# Patient Record
Sex: FEMALE | Race: WHITE | Employment: UNEMPLOYED
[De-identification: names, ages, dates, MRNs, and addresses within clinical notes are randomized per-mention and may not be internally consistent; named-entity substitution may affect disease eponyms.]

---

## 2018-02-05 ENCOUNTER — HOSPITAL ENCOUNTER (OUTPATIENT)
Facility: MEDICAL CENTER | Age: 63
End: 2018-02-05
Payer: MEDICARE

## 2018-02-06 ENCOUNTER — TELEPHONE (OUTPATIENT)
Dept: ONCOLOGY | Age: 63
End: 2018-02-06

## 2018-02-06 ENCOUNTER — INITIAL CONSULT (OUTPATIENT)
Dept: ONCOLOGY | Age: 63
End: 2018-02-06
Payer: MEDICARE

## 2018-02-06 VITALS
RESPIRATION RATE: 20 BRPM | BODY MASS INDEX: 29.95 KG/M2 | TEMPERATURE: 98.2 F | WEIGHT: 169 LBS | SYSTOLIC BLOOD PRESSURE: 123 MMHG | DIASTOLIC BLOOD PRESSURE: 65 MMHG | HEIGHT: 63 IN | HEART RATE: 100 BPM

## 2018-02-06 DIAGNOSIS — E61.1 IRON DEFICIENCY: Primary | ICD-10-CM

## 2018-02-06 DIAGNOSIS — K90.49 MALABSORPTION DUE TO INTOLERANCE, NOT ELSEWHERE CLASSIFIED: ICD-10-CM

## 2018-02-06 PROCEDURE — G8427 DOCREV CUR MEDS BY ELIG CLIN: HCPCS | Performed by: INTERNAL MEDICINE

## 2018-02-06 PROCEDURE — 3017F COLORECTAL CA SCREEN DOC REV: CPT | Performed by: INTERNAL MEDICINE

## 2018-02-06 PROCEDURE — 99204 OFFICE O/P NEW MOD 45 MIN: CPT | Performed by: INTERNAL MEDICINE

## 2018-02-06 PROCEDURE — 99201 HC NEW PT, E/M LEVEL 1: CPT

## 2018-02-06 PROCEDURE — G8419 CALC BMI OUT NRM PARAM NOF/U: HCPCS | Performed by: INTERNAL MEDICINE

## 2018-02-06 PROCEDURE — G8484 FLU IMMUNIZE NO ADMIN: HCPCS | Performed by: INTERNAL MEDICINE

## 2018-02-06 PROCEDURE — 3014F SCREEN MAMMO DOC REV: CPT | Performed by: INTERNAL MEDICINE

## 2018-02-06 RX ORDER — SODIUM CHLORIDE 9 MG/ML
INJECTION, SOLUTION INTRAVENOUS ONCE
Status: CANCELLED | OUTPATIENT
Start: 2018-02-14 | End: 2018-02-06

## 2018-02-06 RX ORDER — SODIUM CHLORIDE 0.9 % (FLUSH) 0.9 %
5 SYRINGE (ML) INJECTION PRN
Status: CANCELLED | OUTPATIENT
Start: 2018-02-14

## 2018-02-06 RX ORDER — HEPARIN SODIUM (PORCINE) LOCK FLUSH IV SOLN 100 UNIT/ML 100 UNIT/ML
500 SOLUTION INTRAVENOUS PRN
Status: CANCELLED | OUTPATIENT
Start: 2018-02-14

## 2018-02-06 RX ORDER — METHYLPREDNISOLONE SODIUM SUCCINATE 125 MG/2ML
125 INJECTION, POWDER, LYOPHILIZED, FOR SOLUTION INTRAMUSCULAR; INTRAVENOUS ONCE
Status: CANCELLED | OUTPATIENT
Start: 2018-02-14 | End: 2018-02-06

## 2018-02-06 RX ORDER — SODIUM CHLORIDE 0.9 % (FLUSH) 0.9 %
10 SYRINGE (ML) INJECTION PRN
Status: CANCELLED | OUTPATIENT
Start: 2018-02-14

## 2018-02-06 RX ORDER — DIPHENHYDRAMINE HYDROCHLORIDE 50 MG/ML
25 INJECTION INTRAMUSCULAR; INTRAVENOUS ONCE
Status: CANCELLED | OUTPATIENT
Start: 2018-02-14 | End: 2018-02-06

## 2018-02-06 RX ORDER — DIPHENHYDRAMINE HYDROCHLORIDE 50 MG/ML
50 INJECTION INTRAMUSCULAR; INTRAVENOUS ONCE
Status: CANCELLED | OUTPATIENT
Start: 2018-02-14 | End: 2018-02-06

## 2018-02-06 RX ORDER — SODIUM CHLORIDE 9 MG/ML
INJECTION, SOLUTION INTRAVENOUS CONTINUOUS
Status: CANCELLED | OUTPATIENT
Start: 2018-02-14

## 2018-02-06 RX ORDER — 0.9 % SODIUM CHLORIDE 0.9 %
10 VIAL (ML) INJECTION ONCE
Status: CANCELLED | OUTPATIENT
Start: 2018-02-14 | End: 2018-02-06

## 2018-02-06 NOTE — LETTER
· Allergic/Immunologic: No nasal congestion or hives. No repeated infections. PHYSICAL EXAM:  The patient is not in acute distress. Vital signs: Blood pressure 123/65, pulse 100, temperature 98.2 °F (36.8 °C), temperature source Oral, resp. rate 20, height 5' 3\" (1.6 m), weight 169 lb (76.7 kg). HEENT:  Eyes are normal. Ears, nose and throat are normal.  Neck: Supple. No lymph node enlargement. No thyroid enlargement. Trachea is centrally located. Chest:  Clear to auscultation. No wheezes or crepitations. Heart: Regular sinus rhythm. Abdomen: Soft, nontender. No hepatosplenomegaly. No masses. Extremities:  With no edema. Lymph Nodes:  No cervical, axillary or inguinal lymph node enlargement. Neurologic:  Conscious and oriented. No focal neurological deficits. Psychosocial: No depression, anxiety or stress. Skin: No rashes, bruises or ecchymoses. Review of Diagnostic data:   No results for input(s): WBC, HGB, HCT, MCV, PLT in the last 720 hours. Chemistry        Component Value Date/Time     10/25/2016 1145    K 4.0 10/25/2016 1145     10/25/2016 1145    CO2 26 10/25/2016 1145    BUN 15 10/25/2016 1145    CREATININE 0.72 10/25/2016 1145        Component Value Date/Time    CALCIUM 10.0 10/25/2016 1145    ALKPHOS 104 09/13/2016 1515    AST 24 09/13/2016 1515    ALT 18 09/13/2016 1515    BILITOT 0.28 (L) 09/13/2016 1515        Lab Results   Component Value Date    FERRITIN 17 02/14/2013     Labs from outside facility showed evidence of severe microcytic anemia with low iron and low ferritin level. IMPRESSION:   Severe microcytic anemia  Severe iron deficiency  Intolerable side effects to oral iron  Gastroparesis  Constipation  Hemorrhoids  Anal fissure    PLAN: I reviewed the labs available to me and discussed with the patient. For more than 60 minutes of face to face discussion, I explained to the patient the nature of this hematologic problem.  I explained the

## 2018-02-06 NOTE — PROGRESS NOTES
_               Ms. Juan Perez is a very pleasant 57 y.o. female with history of multiple comorbidities as listed. She had history of GERD and history of gastroparesis. In addition she had chronic constipation and problems with hemorrhoids bleeding anal fissure. She had GI workup about 2 years ago and was negative. Patient continued to have anemia. She was started on oral iron but she could not tolerate it. She had gastric upset and she had constipation. Patient complains of increasing weakness and fatigue. She feels tired. He has shortness of breath on exertion. No palpitation. She has history of hemorrhoids with occasional bleeding as stated above but no other source of bleeding. No melena or hematochezia. No hematemesis. No vaginal bleeding. No weight loss or decreased appetite. No other complaints. PAST MEDICAL HISTORY: has a past medical history of Anxiety; Arthritis; Depression; Fibromyalgia; GERD (gastroesophageal reflux disease); Hyperlipidemia; and Seizures ([address] PAST SURGICAL HISTORY: has a past surgical history that includes Tubal ligation; lumbar fusion; Carpal tunnel release (Bilateral); Cholecystectomy; knee surgery (Right); and cervical fusion. CURRENT MEDICATIONS:  has a current medication list which includes the following prescription(s): pantoprazole, trulicity, humalog kwikpen, levothyroxine, promethazine, mupirocin, pitavastatin, benazepril, aspirin low dose, naproxen, tizanidine, hydrocodone-acetaminophen, metformin, levetiracetam, duloxetine, fentanyl, alprazolam, abilify, and amitiza. ALLERGIES:  is allergic to amantadines; codeine; darvocet a500 [propoxyphene n-acetaminophen]; depakote [valproic acid]; dye [iodides]; loratadine; lyrica [pregabalin]; nabumetone; pamelor [nortriptyline]; sanctura [trospium]; and wellbutrin [bupropion].     FAMILY HISTORY: Negative for any

## 2018-02-07 ENCOUNTER — HOSPITAL ENCOUNTER (OUTPATIENT)
Facility: MEDICAL CENTER | Age: 63
End: 2018-02-07

## 2018-02-14 ENCOUNTER — HOSPITAL ENCOUNTER (OUTPATIENT)
Dept: INFUSION THERAPY | Facility: MEDICAL CENTER | Age: 63
Discharge: HOME | End: 2018-02-14
Payer: MEDICARE

## 2018-02-14 VITALS
SYSTOLIC BLOOD PRESSURE: 140 MMHG | RESPIRATION RATE: 16 BRPM | TEMPERATURE: 99.2 F | HEART RATE: 98 BPM | DIASTOLIC BLOOD PRESSURE: 75 MMHG

## 2018-02-14 DIAGNOSIS — K90.49 MALABSORPTION DUE TO INTOLERANCE, NOT ELSEWHERE CLASSIFIED: ICD-10-CM

## 2018-02-14 DIAGNOSIS — E61.1 IRON DEFICIENCY: ICD-10-CM

## 2018-02-14 PROCEDURE — 96366 THER/PROPH/DIAG IV INF ADDON: CPT

## 2018-02-14 PROCEDURE — 2580000003 HC RX 258: Performed by: INTERNAL MEDICINE

## 2018-02-14 PROCEDURE — 96376 TX/PRO/DX INJ SAME DRUG ADON: CPT

## 2018-02-14 PROCEDURE — 6360000002 HC RX W HCPCS: Performed by: INTERNAL MEDICINE

## 2018-02-14 PROCEDURE — 96365 THER/PROPH/DIAG IV INF INIT: CPT

## 2018-02-14 RX ORDER — SODIUM CHLORIDE 9 MG/ML
INJECTION, SOLUTION INTRAVENOUS ONCE
Status: COMPLETED | OUTPATIENT
Start: 2018-02-14 | End: 2018-02-14

## 2018-02-14 RX ORDER — DIPHENHYDRAMINE HYDROCHLORIDE 50 MG/ML
25 INJECTION INTRAMUSCULAR; INTRAVENOUS ONCE
Status: CANCELLED | OUTPATIENT
Start: 2018-02-14 | End: 2018-02-14

## 2018-02-14 RX ORDER — METHYLPREDNISOLONE SODIUM SUCCINATE 125 MG/2ML
125 INJECTION, POWDER, LYOPHILIZED, FOR SOLUTION INTRAMUSCULAR; INTRAVENOUS ONCE
Status: CANCELLED | OUTPATIENT
Start: 2018-02-14 | End: 2018-02-14

## 2018-02-14 RX ORDER — POLYETHYLENE GLYCOL 3350 17 G/17G
17 POWDER, FOR SOLUTION ORAL PRN
COMMUNITY
End: 2020-06-23

## 2018-02-14 RX ORDER — EPINEPHRINE 1 MG/ML
0.3 INJECTION, SOLUTION, CONCENTRATE INTRAVENOUS PRN
Status: CANCELLED | OUTPATIENT
Start: 2018-02-14

## 2018-02-14 RX ORDER — SODIUM CHLORIDE 9 MG/ML
INJECTION, SOLUTION INTRAVENOUS ONCE
Status: CANCELLED | OUTPATIENT
Start: 2018-02-14 | End: 2018-02-14

## 2018-02-14 RX ORDER — SODIUM CHLORIDE 9 MG/ML
INJECTION, SOLUTION INTRAVENOUS CONTINUOUS
Status: CANCELLED | OUTPATIENT
Start: 2018-02-14

## 2018-02-14 RX ORDER — NICOTINE POLACRILEX 2 MG
1 GUM BUCCAL 2 TIMES DAILY
COMMUNITY

## 2018-02-14 RX ORDER — HEPARIN SODIUM (PORCINE) LOCK FLUSH IV SOLN 100 UNIT/ML 100 UNIT/ML
500 SOLUTION INTRAVENOUS PRN
Status: CANCELLED | OUTPATIENT
Start: 2018-02-14

## 2018-02-14 RX ORDER — DESONIDE 0.5 MG/G
CREAM TOPICAL 2 TIMES DAILY
COMMUNITY
End: 2020-06-23 | Stop reason: ALTCHOICE

## 2018-02-14 RX ORDER — SODIUM CHLORIDE 0.9 % (FLUSH) 0.9 %
5 SYRINGE (ML) INJECTION PRN
Status: CANCELLED | OUTPATIENT
Start: 2018-02-14

## 2018-02-14 RX ORDER — DIPHENHYDRAMINE HYDROCHLORIDE 50 MG/ML
25 INJECTION INTRAMUSCULAR; INTRAVENOUS ONCE
Status: COMPLETED | OUTPATIENT
Start: 2018-02-14 | End: 2018-02-14

## 2018-02-14 RX ORDER — BUSPIRONE HYDROCHLORIDE 15 MG/1
15 TABLET ORAL 3 TIMES DAILY
COMMUNITY

## 2018-02-14 RX ORDER — DIPHENHYDRAMINE HYDROCHLORIDE 50 MG/ML
50 INJECTION INTRAMUSCULAR; INTRAVENOUS ONCE
Status: CANCELLED | OUTPATIENT
Start: 2018-02-14 | End: 2018-02-14

## 2018-02-14 RX ORDER — SODIUM CHLORIDE 0.9 % (FLUSH) 0.9 %
10 SYRINGE (ML) INJECTION PRN
Status: CANCELLED | OUTPATIENT
Start: 2018-02-14

## 2018-02-14 RX ORDER — 0.9 % SODIUM CHLORIDE 0.9 %
10 VIAL (ML) INJECTION ONCE
Status: CANCELLED | OUTPATIENT
Start: 2018-02-14 | End: 2018-02-14

## 2018-02-14 RX ORDER — M-VIT,TX,IRON,MINS/CALC/FOLIC 27MG-0.4MG
1 TABLET ORAL DAILY
COMMUNITY

## 2018-02-14 RX ADMIN — IRON DEXTRAN 25 MG: 50 INJECTION INTRAMUSCULAR; INTRAVENOUS at 10:35

## 2018-02-14 RX ADMIN — IRON DEXTRAN 1500 MG: 50 INJECTION INTRAMUSCULAR; INTRAVENOUS at 11:50

## 2018-02-14 RX ADMIN — SODIUM CHLORIDE: 9 INJECTION, SOLUTION INTRAVENOUS at 10:23

## 2018-02-14 RX ADMIN — DIPHENHYDRAMINE HYDROCHLORIDE 25 MG: 50 INJECTION, SOLUTION INTRAMUSCULAR; INTRAVENOUS at 10:23

## 2018-02-14 NOTE — PROGRESS NOTES
Corrinne Galt arrives ambulatory alone  Order for iron dextran reviewed  Noted numerous allergies  Iv started with #22 cathlon to rt forearm per policy  See MAR for pre medication  Test dose of infed given over ten minutes and no reaction noted.   Waited one hour after the test dose and no reaction noted  Iron dextran given over four hours and no reaction noted  Iv line flushed and iv line discontinued with needle intact  Pressure dressing applied  Discharged per ambulatory

## 2018-05-01 ENCOUNTER — HOSPITAL ENCOUNTER (OUTPATIENT)
Facility: MEDICAL CENTER | Age: 63
Discharge: HOME | End: 2018-05-01
Payer: MEDICARE

## 2018-05-01 DIAGNOSIS — E61.1 IRON DEFICIENCY: ICD-10-CM

## 2018-05-01 LAB
ABSOLUTE EOS #: 0.1 K/UL (ref 0–0.4)
ABSOLUTE IMMATURE GRANULOCYTE: ABNORMAL K/UL (ref 0–0.3)
ABSOLUTE LYMPH #: 1.6 K/UL (ref 1–4.8)
ABSOLUTE MONO #: 0.6 K/UL (ref 0.2–0.8)
BASOPHILS # BLD: 1 % (ref 0–2)
BASOPHILS ABSOLUTE: 0 K/UL (ref 0–0.2)
DIFFERENTIAL TYPE: ABNORMAL
EOSINOPHILS RELATIVE PERCENT: 2 % (ref 1–4)
FERRITIN: 271 UG/L (ref 13–150)
HCT VFR BLD CALC: 43.2 % (ref 36–46)
HEMOGLOBIN: 14.6 G/DL (ref 12–16)
IMMATURE GRANULOCYTES: ABNORMAL %
IRON SATURATION: 38 % (ref 20–55)
IRON: 112 UG/DL (ref 37–145)
LYMPHOCYTES # BLD: 28 % (ref 24–44)
MCH RBC QN AUTO: 29.9 PG (ref 26–34)
MCHC RBC AUTO-ENTMCNC: 33.7 G/DL (ref 31–37)
MCV RBC AUTO: 88.6 FL (ref 80–100)
MONOCYTES # BLD: 10 % (ref 1–7)
NRBC AUTOMATED: ABNORMAL PER 100 WBC
PDW BLD-RTO: 16.9 % (ref 11.5–14.5)
PLATELET # BLD: 145 K/UL (ref 130–400)
PLATELET ESTIMATE: ABNORMAL
PMV BLD AUTO: 7.6 FL (ref 6–12)
RBC # BLD: 4.88 M/UL (ref 4–5.2)
RBC # BLD: ABNORMAL 10*6/UL
SEG NEUTROPHILS: 59 % (ref 36–66)
SEGMENTED NEUTROPHILS ABSOLUTE COUNT: 3.4 K/UL (ref 1.8–7.7)
TOTAL IRON BINDING CAPACITY: 296 UG/DL (ref 250–450)
UNSATURATED IRON BINDING CAPACITY: 184 UG/DL (ref 112–347)
WBC # BLD: 5.7 K/UL (ref 3.5–11)
WBC # BLD: ABNORMAL 10*3/UL

## 2018-05-01 PROCEDURE — 83540 ASSAY OF IRON: CPT

## 2018-05-01 PROCEDURE — 83550 IRON BINDING TEST: CPT

## 2018-05-01 PROCEDURE — 85025 COMPLETE CBC W/AUTO DIFF WBC: CPT

## 2018-05-01 PROCEDURE — 36415 COLL VENOUS BLD VENIPUNCTURE: CPT

## 2018-05-01 PROCEDURE — 82728 ASSAY OF FERRITIN: CPT

## 2018-05-07 ENCOUNTER — HOSPITAL ENCOUNTER (OUTPATIENT)
Facility: MEDICAL CENTER | Age: 63
End: 2018-05-07

## 2018-05-08 ENCOUNTER — OFFICE VISIT (OUTPATIENT)
Dept: ONCOLOGY | Age: 63
End: 2018-05-08
Payer: MEDICARE

## 2018-05-08 ENCOUNTER — TELEPHONE (OUTPATIENT)
Dept: ONCOLOGY | Age: 63
End: 2018-05-08

## 2018-05-08 VITALS
TEMPERATURE: 98.7 F | WEIGHT: 169 LBS | DIASTOLIC BLOOD PRESSURE: 59 MMHG | RESPIRATION RATE: 20 BRPM | HEART RATE: 106 BPM | SYSTOLIC BLOOD PRESSURE: 113 MMHG | BODY MASS INDEX: 29.94 KG/M2

## 2018-05-08 DIAGNOSIS — K90.49 MALABSORPTION DUE TO INTOLERANCE, NOT ELSEWHERE CLASSIFIED: ICD-10-CM

## 2018-05-08 DIAGNOSIS — E61.1 IRON DEFICIENCY: Primary | ICD-10-CM

## 2018-05-08 PROCEDURE — G8427 DOCREV CUR MEDS BY ELIG CLIN: HCPCS | Performed by: INTERNAL MEDICINE

## 2018-05-08 PROCEDURE — 99214 OFFICE O/P EST MOD 30 MIN: CPT | Performed by: INTERNAL MEDICINE

## 2018-05-08 PROCEDURE — 1090F PRES/ABSN URINE INCON ASSESS: CPT | Performed by: INTERNAL MEDICINE

## 2018-05-08 PROCEDURE — 99211 OFF/OP EST MAY X REQ PHY/QHP: CPT

## 2018-05-08 PROCEDURE — 1036F TOBACCO NON-USER: CPT | Performed by: INTERNAL MEDICINE

## 2018-05-08 PROCEDURE — G8419 CALC BMI OUT NRM PARAM NOF/U: HCPCS | Performed by: INTERNAL MEDICINE

## 2018-05-08 PROCEDURE — 1123F ACP DISCUSS/DSCN MKR DOCD: CPT | Performed by: INTERNAL MEDICINE

## 2018-05-08 PROCEDURE — 4040F PNEUMOC VAC/ADMIN/RCVD: CPT | Performed by: INTERNAL MEDICINE

## 2018-05-08 PROCEDURE — 3017F COLORECTAL CA SCREEN DOC REV: CPT | Performed by: INTERNAL MEDICINE

## 2018-05-08 PROCEDURE — G8400 PT W/DXA NO RESULTS DOC: HCPCS | Performed by: INTERNAL MEDICINE

## 2018-07-18 ENCOUNTER — HOSPITAL ENCOUNTER (OUTPATIENT)
Dept: MAMMOGRAPHY | Age: 63
Discharge: HOME | End: 2018-07-20
Payer: MEDICARE

## 2018-07-18 DIAGNOSIS — Z12.31 VISIT FOR SCREENING MAMMOGRAM: ICD-10-CM

## 2018-07-18 PROCEDURE — 77063 BREAST TOMOSYNTHESIS BI: CPT

## 2018-08-30 ENCOUNTER — HOSPITAL ENCOUNTER (OUTPATIENT)
Age: 63
Discharge: HOME | End: 2018-09-01
Payer: MEDICARE

## 2018-08-30 ENCOUNTER — HOSPITAL ENCOUNTER (OUTPATIENT)
Dept: GENERAL RADIOLOGY | Age: 63
Discharge: HOME | End: 2018-09-01
Payer: MEDICARE

## 2018-08-30 DIAGNOSIS — F41.9 ANXIETY HYPERVENTILATION: ICD-10-CM

## 2018-08-30 DIAGNOSIS — F45.8 ANXIETY HYPERVENTILATION: ICD-10-CM

## 2018-08-30 PROCEDURE — 71046 X-RAY EXAM CHEST 2 VIEWS: CPT

## 2018-10-15 ENCOUNTER — TELEPHONE (OUTPATIENT)
Dept: ONCOLOGY | Age: 63
End: 2018-10-15

## 2018-10-24 ENCOUNTER — HOSPITAL ENCOUNTER (OUTPATIENT)
Facility: MEDICAL CENTER | Age: 63
End: 2018-10-24
Payer: COMMERCIAL

## 2018-10-26 ENCOUNTER — TELEPHONE (OUTPATIENT)
Dept: ONCOLOGY | Age: 63
End: 2018-10-26

## 2018-10-26 ENCOUNTER — HOSPITAL ENCOUNTER (OUTPATIENT)
Facility: MEDICAL CENTER | Age: 63
Discharge: HOME | End: 2018-10-26
Payer: COMMERCIAL

## 2018-10-26 DIAGNOSIS — E61.1 IRON DEFICIENCY: ICD-10-CM

## 2018-10-26 LAB
ABSOLUTE EOS #: 0.2 K/UL (ref 0–0.4)
ABSOLUTE IMMATURE GRANULOCYTE: ABNORMAL K/UL (ref 0–0.3)
ABSOLUTE LYMPH #: 2.1 K/UL (ref 1–4.8)
ABSOLUTE MONO #: 0.5 K/UL (ref 0.2–0.8)
BASOPHILS # BLD: 1 % (ref 0–2)
BASOPHILS ABSOLUTE: 0 K/UL (ref 0–0.2)
DIFFERENTIAL TYPE: ABNORMAL
EOSINOPHILS RELATIVE PERCENT: 2 % (ref 1–4)
FERRITIN: 257 UG/L (ref 13–150)
HCT VFR BLD CALC: 39.7 % (ref 36–46)
HEMOGLOBIN: 13.6 G/DL (ref 12–16)
IMMATURE GRANULOCYTES: ABNORMAL %
IRON SATURATION: 25 % (ref 20–55)
IRON: 67 UG/DL (ref 37–145)
LYMPHOCYTES # BLD: 32 % (ref 24–44)
MCH RBC QN AUTO: 31.3 PG (ref 26–34)
MCHC RBC AUTO-ENTMCNC: 34.3 G/DL (ref 31–37)
MCV RBC AUTO: 91.2 FL (ref 80–100)
MONOCYTES # BLD: 8 % (ref 1–7)
NRBC AUTOMATED: ABNORMAL PER 100 WBC
PDW BLD-RTO: 13.1 % (ref 11.5–14.5)
PLATELET # BLD: 187 K/UL (ref 130–400)
PLATELET ESTIMATE: ABNORMAL
PMV BLD AUTO: 7.7 FL (ref 6–12)
RBC # BLD: 4.36 M/UL (ref 4–5.2)
RBC # BLD: ABNORMAL 10*6/UL
SEG NEUTROPHILS: 57 % (ref 36–66)
SEGMENTED NEUTROPHILS ABSOLUTE COUNT: 3.8 K/UL (ref 1.8–7.7)
TOTAL IRON BINDING CAPACITY: 272 UG/DL (ref 250–450)
UNSATURATED IRON BINDING CAPACITY: 205 UG/DL (ref 112–347)
WBC # BLD: 6.7 K/UL (ref 3.5–11)
WBC # BLD: ABNORMAL 10*3/UL

## 2018-10-26 PROCEDURE — 36415 COLL VENOUS BLD VENIPUNCTURE: CPT

## 2018-10-26 PROCEDURE — 83550 IRON BINDING TEST: CPT

## 2018-10-26 PROCEDURE — 83540 ASSAY OF IRON: CPT

## 2018-10-26 PROCEDURE — 85025 COMPLETE CBC W/AUTO DIFF WBC: CPT

## 2018-10-26 PROCEDURE — 82728 ASSAY OF FERRITIN: CPT

## 2018-10-26 NOTE — TELEPHONE ENCOUNTER
Working on generic schedule and no lab values found from today. Placed call and spoke with Darren Quintero confirmed she intends to come in for lab later this afternoon. Miriam Kimball has a new [employer] and reports she plans to go to registration first to update account.   Confirmed standing orders are in system CDP/ Ferritin/ Fe.

## 2018-11-01 ENCOUNTER — TELEPHONE (OUTPATIENT)
Dept: ONCOLOGY | Age: 63
End: 2018-11-01

## 2018-11-01 ENCOUNTER — OFFICE VISIT (OUTPATIENT)
Dept: ONCOLOGY | Age: 63
End: 2018-11-01
Payer: COMMERCIAL

## 2018-11-01 VITALS
HEART RATE: 93 BPM | WEIGHT: 166.3 LBS | RESPIRATION RATE: 18 BRPM | TEMPERATURE: 98.3 F | BODY MASS INDEX: 29.46 KG/M2 | DIASTOLIC BLOOD PRESSURE: 61 MMHG | SYSTOLIC BLOOD PRESSURE: 111 MMHG

## 2018-11-01 DIAGNOSIS — E61.1 IRON DEFICIENCY: Primary | ICD-10-CM

## 2018-11-01 DIAGNOSIS — K90.49 MALABSORPTION DUE TO INTOLERANCE, NOT ELSEWHERE CLASSIFIED: ICD-10-CM

## 2018-11-01 DIAGNOSIS — G62.9 NEUROPATHY: ICD-10-CM

## 2018-11-01 PROCEDURE — 99214 OFFICE O/P EST MOD 30 MIN: CPT | Performed by: INTERNAL MEDICINE

## 2018-11-01 PROCEDURE — 1036F TOBACCO NON-USER: CPT | Performed by: INTERNAL MEDICINE

## 2018-11-01 PROCEDURE — 3017F COLORECTAL CA SCREEN DOC REV: CPT | Performed by: INTERNAL MEDICINE

## 2018-11-01 PROCEDURE — G8484 FLU IMMUNIZE NO ADMIN: HCPCS | Performed by: INTERNAL MEDICINE

## 2018-11-01 PROCEDURE — 1123F ACP DISCUSS/DSCN MKR DOCD: CPT | Performed by: INTERNAL MEDICINE

## 2018-11-01 PROCEDURE — 1101F PT FALLS ASSESS-DOCD LE1/YR: CPT | Performed by: INTERNAL MEDICINE

## 2018-11-01 PROCEDURE — 1090F PRES/ABSN URINE INCON ASSESS: CPT | Performed by: INTERNAL MEDICINE

## 2018-11-01 PROCEDURE — 99211 OFF/OP EST MAY X REQ PHY/QHP: CPT

## 2018-11-01 PROCEDURE — 4040F PNEUMOC VAC/ADMIN/RCVD: CPT | Performed by: INTERNAL MEDICINE

## 2018-11-01 PROCEDURE — G8400 PT W/DXA NO RESULTS DOC: HCPCS | Performed by: INTERNAL MEDICINE

## 2018-11-01 PROCEDURE — G8419 CALC BMI OUT NRM PARAM NOF/U: HCPCS | Performed by: INTERNAL MEDICINE

## 2018-11-01 PROCEDURE — G8427 DOCREV CUR MEDS BY ELIG CLIN: HCPCS | Performed by: INTERNAL MEDICINE

## 2018-11-01 RX ORDER — CEFUROXIME AXETIL 250 MG/1
250 TABLET ORAL 2 TIMES DAILY
COMMUNITY
Start: 2018-10-31 | End: 2019-05-23 | Stop reason: ALTCHOICE

## 2018-11-01 RX ORDER — ACETAMINOPHEN 325 MG/1
650 TABLET ORAL EVERY 6 HOURS PRN
COMMUNITY

## 2018-11-01 NOTE — PROGRESS NOTES
complaints. · Dermatologic: No skin rashes or pruritus. No skin lesions or discolorations. · Psychiatric: No depression, anxiety, or stress or signs of schizophrenia. No change in mood or affect. · Hematologic: No history of bleeding tendency. No bruises or ecchymosis. No history of clotting problems. · Infectious disease: No fever, chills or frequent infections. · Endocrine: No problems with opacity. No polydipsia or polyuria. No temperature intolerance. · Neurologic: No headaches or dizziness. No weakness or numbness of the extremities. No changes in balance, coordination,  memory, mentation, behavior. · Allergic/Immunologic: No nasal congestion or hives. No repeated infections. PHYSICAL EXAM:  The patient is not in acute distress. Vital signs: Blood pressure 111/61, pulse 93, temperature 98.3 °F (36.8 °C), temperature source Oral, resp. rate 18, weight 166 lb 4.8 oz (75.4 kg). HEENT:  Eyes are normal. Ears, nose and throat are normal.  Neck: Supple. No lymph node enlargement. No thyroid enlargement. Trachea is centrally located. Chest:  Clear to auscultation. No wheezes or crepitations. Heart: Regular sinus rhythm. Abdomen: Soft, nontender. No hepatosplenomegaly. No masses. Extremities:  With no edema. Lymph Nodes:  No cervical, axillary or inguinal lymph node enlargement. Neurologic:  Conscious and oriented. No focal neurological deficits. Psychosocial: No depression, anxiety or stress. Skin: No rashes, bruises or ecchymoses.       Review of Diagnostic data:   Recent Labs      10/26/18   1517   WBC  6.7   HGB  13.6   HCT  39.7   MCV  91.2   PLT  187       Chemistry        Component Value Date/Time     10/25/2016 1145    K 4.0 10/25/2016 1145     10/25/2016 1145    CO2 26 10/25/2016 1145    BUN 15 10/25/2016 1145    CREATININE 0.72 10/25/2016 1145        Component Value Date/Time    CALCIUM 10.0 10/25/2016 1145    ALKPHOS 104 09/13/2016 1515    AST 24 09/13/2016 1515

## 2019-01-17 ENCOUNTER — TELEPHONE (OUTPATIENT)
Dept: ONCOLOGY | Age: 64
End: 2019-01-17

## 2019-01-18 ENCOUNTER — TELEPHONE (OUTPATIENT)
Dept: ONCOLOGY | Age: 64
End: 2019-01-18

## 2019-01-22 ENCOUNTER — HOSPITAL ENCOUNTER (OUTPATIENT)
Facility: MEDICAL CENTER | Age: 64
Discharge: HOME | End: 2019-01-22
Payer: COMMERCIAL

## 2019-01-22 DIAGNOSIS — E61.1 IRON DEFICIENCY: ICD-10-CM

## 2019-01-22 LAB
ABSOLUTE EOS #: 0.1 K/UL (ref 0–0.4)
ABSOLUTE IMMATURE GRANULOCYTE: ABNORMAL K/UL (ref 0–0.3)
ABSOLUTE LYMPH #: 1.7 K/UL (ref 1–4.8)
ABSOLUTE MONO #: 0.6 K/UL (ref 0.2–0.8)
BASOPHILS # BLD: 0 % (ref 0–2)
BASOPHILS ABSOLUTE: 0 K/UL (ref 0–0.2)
DIFFERENTIAL TYPE: ABNORMAL
EOSINOPHILS RELATIVE PERCENT: 2 % (ref 1–4)
HCT VFR BLD CALC: 45 % (ref 36–46)
HEMOGLOBIN: 15.1 G/DL (ref 12–16)
IMMATURE GRANULOCYTES: ABNORMAL %
LYMPHOCYTES # BLD: 31 % (ref 24–44)
MCH RBC QN AUTO: 30.4 PG (ref 26–34)
MCHC RBC AUTO-ENTMCNC: 33.5 G/DL (ref 31–37)
MCV RBC AUTO: 90.9 FL (ref 80–100)
MONOCYTES # BLD: 11 % (ref 1–7)
NRBC AUTOMATED: ABNORMAL PER 100 WBC
PDW BLD-RTO: 13.3 % (ref 11.5–14.5)
PLATELET # BLD: 154 K/UL (ref 130–400)
PLATELET ESTIMATE: ABNORMAL
PMV BLD AUTO: 7.9 FL (ref 6–12)
RBC # BLD: 4.95 M/UL (ref 4–5.2)
RBC # BLD: ABNORMAL 10*6/UL
SEG NEUTROPHILS: 56 % (ref 36–66)
SEGMENTED NEUTROPHILS ABSOLUTE COUNT: 3.1 K/UL (ref 1.8–7.7)
WBC # BLD: 5.5 K/UL (ref 3.5–11)
WBC # BLD: ABNORMAL 10*3/UL

## 2019-01-22 PROCEDURE — 36415 COLL VENOUS BLD VENIPUNCTURE: CPT

## 2019-01-22 PROCEDURE — 85025 COMPLETE CBC W/AUTO DIFF WBC: CPT

## 2019-04-25 DIAGNOSIS — E61.1 IRON DEFICIENCY: Primary | ICD-10-CM

## 2019-04-30 ENCOUNTER — HOSPITAL ENCOUNTER (OUTPATIENT)
Facility: MEDICAL CENTER | Age: 64
Discharge: HOME | End: 2019-04-30
Payer: COMMERCIAL

## 2019-04-30 DIAGNOSIS — E61.1 IRON DEFICIENCY: ICD-10-CM

## 2019-04-30 LAB
ABSOLUTE EOS #: 0.06 K/UL (ref 0–0.44)
ABSOLUTE IMMATURE GRANULOCYTE: 0.01 K/UL (ref 0–0.3)
ABSOLUTE LYMPH #: 1.31 K/UL (ref 1.1–3.7)
ABSOLUTE MONO #: 0.55 K/UL (ref 0.1–1.2)
BASOPHILS # BLD: 1 % (ref 0–2)
BASOPHILS ABSOLUTE: 0.05 K/UL (ref 0–0.2)
DIFFERENTIAL TYPE: ABNORMAL
EOSINOPHILS RELATIVE PERCENT: 1 % (ref 1–4)
FERRITIN: 81 UG/L (ref 13–150)
HCT VFR BLD CALC: 43.4 % (ref 36.3–47.1)
HEMOGLOBIN: 14 G/DL (ref 11.9–15.1)
IMMATURE GRANULOCYTES: 0 %
IRON SATURATION: 20 % (ref 20–55)
IRON: 60 UG/DL (ref 37–145)
LYMPHOCYTES # BLD: 22 % (ref 24–43)
MCH RBC QN AUTO: 30.8 PG (ref 25.2–33.5)
MCHC RBC AUTO-ENTMCNC: 32.3 G/DL (ref 28.4–34.8)
MCV RBC AUTO: 95.6 FL (ref 82.6–102.9)
MONOCYTES # BLD: 9 % (ref 3–12)
NRBC AUTOMATED: 0 PER 100 WBC
PDW BLD-RTO: 12.7 % (ref 11.8–14.4)
PLATELET # BLD: 184 K/UL (ref 138–453)
PLATELET ESTIMATE: ABNORMAL
PMV BLD AUTO: 9.8 FL (ref 8.1–13.5)
RBC # BLD: 4.54 M/UL (ref 3.95–5.11)
RBC # BLD: ABNORMAL 10*6/UL
SEG NEUTROPHILS: 67 % (ref 36–65)
SEGMENTED NEUTROPHILS ABSOLUTE COUNT: 4.06 K/UL (ref 1.5–8.1)
TOTAL IRON BINDING CAPACITY: 307 UG/DL (ref 250–450)
UNSATURATED IRON BINDING CAPACITY: 247 UG/DL (ref 112–347)
WBC # BLD: 6 K/UL (ref 3.5–11.3)
WBC # BLD: ABNORMAL 10*3/UL

## 2019-04-30 PROCEDURE — 82728 ASSAY OF FERRITIN: CPT

## 2019-04-30 PROCEDURE — 83550 IRON BINDING TEST: CPT

## 2019-04-30 PROCEDURE — 85025 COMPLETE CBC W/AUTO DIFF WBC: CPT

## 2019-04-30 PROCEDURE — 36415 COLL VENOUS BLD VENIPUNCTURE: CPT

## 2019-04-30 PROCEDURE — 83540 ASSAY OF IRON: CPT

## 2019-05-02 ENCOUNTER — HOSPITAL ENCOUNTER (OUTPATIENT)
Facility: MEDICAL CENTER | Age: 64
End: 2019-05-02
Payer: COMMERCIAL

## 2019-05-23 ENCOUNTER — TELEPHONE (OUTPATIENT)
Dept: ONCOLOGY | Age: 64
End: 2019-05-23

## 2019-05-23 ENCOUNTER — OFFICE VISIT (OUTPATIENT)
Dept: ONCOLOGY | Age: 64
End: 2019-05-23
Payer: COMMERCIAL

## 2019-05-23 VITALS
DIASTOLIC BLOOD PRESSURE: 61 MMHG | TEMPERATURE: 99 F | WEIGHT: 157.3 LBS | BODY MASS INDEX: 27.86 KG/M2 | HEART RATE: 102 BPM | RESPIRATION RATE: 18 BRPM | SYSTOLIC BLOOD PRESSURE: 134 MMHG

## 2019-05-23 DIAGNOSIS — K90.49 MALABSORPTION DUE TO INTOLERANCE, NOT ELSEWHERE CLASSIFIED: Primary | ICD-10-CM

## 2019-05-23 DIAGNOSIS — E61.1 IRON DEFICIENCY: ICD-10-CM

## 2019-05-23 DIAGNOSIS — G62.9 NEUROPATHY: ICD-10-CM

## 2019-05-23 PROCEDURE — 3017F COLORECTAL CA SCREEN DOC REV: CPT | Performed by: INTERNAL MEDICINE

## 2019-05-23 PROCEDURE — 1036F TOBACCO NON-USER: CPT | Performed by: INTERNAL MEDICINE

## 2019-05-23 PROCEDURE — 99214 OFFICE O/P EST MOD 30 MIN: CPT | Performed by: INTERNAL MEDICINE

## 2019-05-23 PROCEDURE — G8419 CALC BMI OUT NRM PARAM NOF/U: HCPCS | Performed by: INTERNAL MEDICINE

## 2019-05-23 PROCEDURE — 1090F PRES/ABSN URINE INCON ASSESS: CPT | Performed by: INTERNAL MEDICINE

## 2019-05-23 PROCEDURE — 1123F ACP DISCUSS/DSCN MKR DOCD: CPT | Performed by: INTERNAL MEDICINE

## 2019-05-23 PROCEDURE — 4040F PNEUMOC VAC/ADMIN/RCVD: CPT | Performed by: INTERNAL MEDICINE

## 2019-05-23 PROCEDURE — G8400 PT W/DXA NO RESULTS DOC: HCPCS | Performed by: INTERNAL MEDICINE

## 2019-05-23 PROCEDURE — 99211 OFF/OP EST MAY X REQ PHY/QHP: CPT | Performed by: INTERNAL MEDICINE

## 2019-05-23 PROCEDURE — G8427 DOCREV CUR MEDS BY ELIG CLIN: HCPCS | Performed by: INTERNAL MEDICINE

## 2019-05-26 NOTE — PROGRESS NOTES
_           Chief Complaint   Patient presents with    Follow-up     neck surgery     DIAGNOSIS:       Severe microcytic anemia  Severe iron deficiency  Intolerable side effects to oral iron  Gastroparesis  Constipation  Hemorrhoids  Anal fissure      CURRENT THERAPY:         IV iron infusion February 2018    BRIEF CASE HISTORY:      Ms. Isadora Castillo is a very pleasant 75 y.o. female with history of multiple comorbidities as listed. She had history of GERD and history of gastroparesis. In addition she had chronic constipation and problems with hemorrhoids bleeding anal fissure. She had GI workup about 2 years ago and was negative. Patient continued to have anemia. She was started on oral iron but she could not tolerate it. She had gastric upset and she had constipation. Patient complains of increasing weakness and fatigue. She feels tired. He has shortness of breath on exertion. No palpitation. She has history of hemorrhoids with occasional bleeding as stated above but no other source of bleeding. No melena or hematochezia. No hematemesis. No vaginal bleeding. No weight loss or decreased appetite. No other complaints. INTERIM HISTORY:   The patient seen for follow-up iron deficiency anemia. She had IV iron infusion in February 2018. She did very well. Hemoglobin is normal.  She had more strength and energy. She continued to feel tired occasionally. No dizziness. No chest pain or palpitation. No active bleeding. PAST MEDICAL HISTORY: has a past medical history of Anxiety, Arthritis, Depression, Fibromyalgia, GERD (gastroesophageal reflux disease), Hyperlipidemia, and Seizures ([address] PAST SURGICAL HISTORY: has a past surgical history that includes Tubal ligation; lumbar fusion; Carpal tunnel release (Bilateral); Cholecystectomy; knee surgery (Right); and cervical fusion.      CURRENT MEDICATIONS: has a current medication list which includes the following prescription(s): magnesium gluconate, acetaminophen, emollient, therapeutic multivitamin-minerals, cranberry-vitamin c-vitamin e, desonide, polyethylene glycol, insulin aspart, biotin, vortioxetine, buspirone, milk thistle, pantoprazole, trulicity, promethazine, mupirocin, pitavastatin, benazepril, aspirin low dose, tizanidine, metformin, levetiracetam, and duloxetine. ALLERGIES:  is allergic to latex; amantadines; codeine; darvocet a500 [propoxyphene n-acetaminophen]; depakote [valproic acid]; dye [iodides]; loratadine; lyrica [pregabalin]; nabumetone; pamelor [nortriptyline]; sanctura [trospium]; and wellbutrin [bupropion]. FAMILY HISTORY: Negative for any hematological or oncological conditions. SOCIAL HISTORY:  reports that she has never smoked. She has never used smokeless tobacco. She reports that she does not drink alcohol or use drugs. REVIEW OF SYSTEMS:     · General: Positive for weakness and fatigue. No unanticipated weight loss or decreased appetite. No fever or chills. · Eyes: No blurred vision, eye pain or double vision. · Ears: No hearing problems or drainage. No tinnitus. · Throat: No sore throat, problems with swallowing or dysphagia. · Respiratory: No cough, sputum or hemoptysis. Positive for exertional shortness of breath. No pleuritic chest pain. · Cardiovascular: No chest pain, orthopnea or PND. No lower extremity edema. No palpitation. · Gastrointestinal: History of gastroparesis. No problems with swallowing. No abdominal pain or bloating. No nausea or vomiting. Positive for constipation. No GI bleeding. · Genitourinary: No dysuria, hematuria, frequency or urgency. · Musculoskeletal: No muscle aches or pains. No limitation of movement. No back pain. No gait disturbance, No joint complaints. · Dermatologic: No skin rashes or pruritus. No skin lesions or discolorations.    · Psychiatric: No depression, anxiety, or stress or signs of schizophrenia. No change in mood or affect. · Hematologic: No history of bleeding tendency. No bruises or ecchymosis. No history of clotting problems. · Infectious disease: No fever, chills or frequent infections. · Endocrine: No problems with opacity. No polydipsia or polyuria. No temperature intolerance. · Neurologic: No headaches or dizziness. No weakness or numbness of the extremities. No changes in balance, coordination,  memory, mentation, behavior. · Allergic/Immunologic: No nasal congestion or hives. No repeated infections. PHYSICAL EXAM:  The patient is not in acute distress. Vital signs: Blood pressure 134/61, pulse 102, temperature 99 °F (37.2 °C), temperature source Oral, resp. rate 18, weight 157 lb 4.8 oz (71.4 kg). HEENT:  Eyes are normal. Ears, nose and throat are normal.  Neck: Supple. No lymph node enlargement. No thyroid enlargement. Trachea is centrally located. Chest:  Clear to auscultation. No wheezes or crepitations. Heart: Regular sinus rhythm. Abdomen: Soft, nontender. No hepatosplenomegaly. No masses. Extremities:  With no edema. Lymph Nodes:  No cervical, axillary or inguinal lymph node enlargement. Neurologic:  Conscious and oriented. No focal neurological deficits. Psychosocial: No depression, anxiety or stress. Skin: No rashes, bruises or ecchymoses.       Review of Diagnostic data:   Recent Labs     04/30/19  1450   WBC 6.0   HGB 14.0   HCT 43.4   MCV 95.6          Chemistry        Component Value Date/Time     10/25/2016 1145    K 4.0 10/25/2016 1145     10/25/2016 1145    CO2 26 10/25/2016 1145    BUN 15 10/25/2016 1145    CREATININE 0.72 10/25/2016 1145        Component Value Date/Time    CALCIUM 10.0 10/25/2016 1145    ALKPHOS 104 09/13/2016 1515    AST 24 09/13/2016 1515    ALT 18 09/13/2016 1515    BILITOT 0.28 (L) 09/13/2016 1515        Lab Results   Component Value Date    IRON 60 04/30/2019    TIBC 307 04/30/2019    FERRITIN 81 04/30/2019     Labs from outside facility showed evidence of severe microcytic anemia with low iron and low ferritin level. IMPRESSION:   Severe microcytic anemia  Severe iron deficiency  Intolerable side effects to oral iron  Gastroparesis  Constipation  Hemorrhoids  Anal fissure    PLAN: I reviewed the labs as above and discussed with the patient. I explained to the patient the nature of this hematologic problem. I explained the significance of these abnormalities in layman language. Reviewing lab tests or systemic outside facility showed evidence of chronic microcytic anemia. She was treated for iron deficiency anemia in the past with variable results. She could not tolerate the oral iron. She was given IV iron infusion. Repeated labs showed excellent response to treatment. Hemoglobin is now normal.  Iron studies are normal.   Patient continues to feel slightly tired. I think this is related to different etiology. At this point recommendation is for monitoring. We will repeat the labs with iron studies in 12 months. Patient's questions were answered to the best of her satisfaction and she verbalized full understanding and agreement.

## 2020-06-16 ENCOUNTER — HOSPITAL ENCOUNTER (OUTPATIENT)
Facility: MEDICAL CENTER | Age: 65
Discharge: HOME | End: 2020-06-16
Payer: COMMERCIAL

## 2020-06-16 DIAGNOSIS — E61.1 IRON DEFICIENCY: ICD-10-CM

## 2020-06-16 LAB
ABSOLUTE EOS #: 0.07 K/UL (ref 0–0.44)
ABSOLUTE IMMATURE GRANULOCYTE: 0.02 K/UL (ref 0–0.3)
ABSOLUTE LYMPH #: 1.13 K/UL (ref 1.1–3.7)
ABSOLUTE MONO #: 0.3 K/UL (ref 0.1–1.2)
BASOPHILS # BLD: 1 % (ref 0–2)
BASOPHILS ABSOLUTE: 0.03 K/UL (ref 0–0.2)
DIFFERENTIAL TYPE: ABNORMAL
EOSINOPHILS RELATIVE PERCENT: 2 % (ref 1–4)
FERRITIN: 28 UG/L (ref 13–150)
HCT VFR BLD CALC: 42.2 % (ref 36.3–47.1)
HEMOGLOBIN: 13.5 G/DL (ref 11.9–15.1)
IMMATURE GRANULOCYTES: 1 %
IRON SATURATION: 23 % (ref 20–55)
IRON: 69 UG/DL (ref 37–145)
LYMPHOCYTES # BLD: 29 % (ref 24–43)
MCH RBC QN AUTO: 30.9 PG (ref 25.2–33.5)
MCHC RBC AUTO-ENTMCNC: 32 G/DL (ref 28.4–34.8)
MCV RBC AUTO: 96.6 FL (ref 82.6–102.9)
MONOCYTES # BLD: 8 % (ref 3–12)
NRBC AUTOMATED: 0 PER 100 WBC
PDW BLD-RTO: 12.5 % (ref 11.8–14.4)
PLATELET # BLD: 140 K/UL (ref 138–453)
PLATELET ESTIMATE: ABNORMAL
PMV BLD AUTO: 10.2 FL (ref 8.1–13.5)
RBC # BLD: 4.37 M/UL (ref 3.95–5.11)
RBC # BLD: ABNORMAL 10*6/UL
SEG NEUTROPHILS: 59 % (ref 36–65)
SEGMENTED NEUTROPHILS ABSOLUTE COUNT: 2.37 K/UL (ref 1.5–8.1)
TOTAL IRON BINDING CAPACITY: 298 UG/DL (ref 250–450)
UNSATURATED IRON BINDING CAPACITY: 229 UG/DL (ref 112–347)
WBC # BLD: 3.9 K/UL (ref 3.5–11.3)
WBC # BLD: ABNORMAL 10*3/UL

## 2020-06-16 PROCEDURE — 83540 ASSAY OF IRON: CPT

## 2020-06-16 PROCEDURE — 83550 IRON BINDING TEST: CPT

## 2020-06-16 PROCEDURE — 85025 COMPLETE CBC W/AUTO DIFF WBC: CPT

## 2020-06-16 PROCEDURE — 36415 COLL VENOUS BLD VENIPUNCTURE: CPT

## 2020-06-16 PROCEDURE — 82728 ASSAY OF FERRITIN: CPT

## 2020-06-19 ENCOUNTER — HOSPITAL ENCOUNTER (OUTPATIENT)
Facility: MEDICAL CENTER | Age: 65
End: 2020-06-19
Payer: COMMERCIAL

## 2020-06-23 ENCOUNTER — TELEPHONE (OUTPATIENT)
Dept: ONCOLOGY | Age: 65
End: 2020-06-23

## 2020-06-23 ENCOUNTER — OFFICE VISIT (OUTPATIENT)
Dept: ONCOLOGY | Age: 65
End: 2020-06-23
Payer: COMMERCIAL

## 2020-06-23 VITALS
WEIGHT: 151.1 LBS | HEART RATE: 100 BPM | BODY MASS INDEX: 26.77 KG/M2 | TEMPERATURE: 98.4 F | DIASTOLIC BLOOD PRESSURE: 67 MMHG | SYSTOLIC BLOOD PRESSURE: 124 MMHG

## 2020-06-23 PROCEDURE — G8417 CALC BMI ABV UP PARAM F/U: HCPCS | Performed by: INTERNAL MEDICINE

## 2020-06-23 PROCEDURE — G8400 PT W/DXA NO RESULTS DOC: HCPCS | Performed by: INTERNAL MEDICINE

## 2020-06-23 PROCEDURE — 4040F PNEUMOC VAC/ADMIN/RCVD: CPT | Performed by: INTERNAL MEDICINE

## 2020-06-23 PROCEDURE — 3017F COLORECTAL CA SCREEN DOC REV: CPT | Performed by: INTERNAL MEDICINE

## 2020-06-23 PROCEDURE — 99214 OFFICE O/P EST MOD 30 MIN: CPT | Performed by: INTERNAL MEDICINE

## 2020-06-23 PROCEDURE — 1036F TOBACCO NON-USER: CPT | Performed by: INTERNAL MEDICINE

## 2020-06-23 PROCEDURE — 1090F PRES/ABSN URINE INCON ASSESS: CPT | Performed by: INTERNAL MEDICINE

## 2020-06-23 PROCEDURE — 99211 OFF/OP EST MAY X REQ PHY/QHP: CPT | Performed by: INTERNAL MEDICINE

## 2020-06-23 PROCEDURE — G8427 DOCREV CUR MEDS BY ELIG CLIN: HCPCS | Performed by: INTERNAL MEDICINE

## 2020-06-23 PROCEDURE — 1123F ACP DISCUSS/DSCN MKR DOCD: CPT | Performed by: INTERNAL MEDICINE

## 2020-06-23 RX ORDER — INSULIN DEGLUDEC 200 U/ML
INJECTION, SOLUTION SUBCUTANEOUS
COMMUNITY

## 2020-06-23 NOTE — PROGRESS NOTES
No depression, anxiety, or stress or signs of schizophrenia. No change in mood or affect. · Hematologic: No history of bleeding tendency. No bruises or ecchymosis. No history of clotting problems. · Infectious disease: No fever, chills or frequent infections. · Endocrine: No problems with opacity. No polydipsia or polyuria. No temperature intolerance. · Neurologic: No headaches or dizziness. No weakness or numbness of the extremities. No changes in balance, coordination,  memory, mentation, behavior. · Allergic/Immunologic: No nasal congestion or hives. No repeated infections. PHYSICAL EXAM:  The patient is not in acute distress. Vital signs: Blood pressure 124/67, pulse 100, temperature 98.4 °F (36.9 °C), temperature source Oral, weight 151 lb 1.6 oz (68.5 kg). HEENT:  Eyes are normal. Ears, nose and throat are normal.  Neck: Supple. No lymph node enlargement. No thyroid enlargement. Trachea is centrally located. Chest:  Clear to auscultation. No wheezes or crepitations. Heart: Regular sinus rhythm. Abdomen: Soft, nontender. No hepatosplenomegaly. No masses. Extremities:  With no edema. Lymph Nodes:  No cervical, axillary or inguinal lymph node enlargement. Neurologic:  Conscious and oriented. No focal neurological deficits. Psychosocial: No depression, anxiety or stress. Skin: No rashes, bruises or ecchymoses.       Review of Diagnostic data:   Recent Labs     06/16/20  1428   WBC 3.9   HGB 13.5   HCT 42.2   MCV 96.6          Chemistry        Component Value Date/Time     10/25/2016 1145    K 4.0 10/25/2016 1145     10/25/2016 1145    CO2 26 10/25/2016 1145    BUN 15 10/25/2016 1145    CREATININE 0.72 10/25/2016 1145        Component Value Date/Time    CALCIUM 10.0 10/25/2016 1145    ALKPHOS 104 09/13/2016 1515    AST 24 09/13/2016 1515    ALT 18 09/13/2016 1515    BILITOT 0.28 (L) 09/13/2016 1515        Lab Results   Component Value Date    IRON 69 06/16/2020    TIBC

## 2020-06-23 NOTE — TELEPHONE ENCOUNTER
Abby Land MD VISIT  DR Shazia Cordon IN TO SEE PATIENT  ORDERS RECEIVED  RV 1 YEAR W/ LABS PRIOR  AVS PRINTED AND GIVEN TO PATIENT W/ INSTRUCTIONS  AVS PRINTED AND PUT IN CALL FOLDER  PATIENT DISCHARGED AMBULATORY

## 2020-12-16 ENCOUNTER — TELEPHONE (OUTPATIENT)
Dept: ONCOLOGY | Age: 65
End: 2020-12-16

## 2021-06-17 ENCOUNTER — HOSPITAL ENCOUNTER (OUTPATIENT)
Facility: MEDICAL CENTER | Age: 66
Discharge: HOME | End: 2021-06-17
Payer: COMMERCIAL

## 2021-06-17 ENCOUNTER — HOSPITAL ENCOUNTER (OUTPATIENT)
Facility: MEDICAL CENTER | Age: 66
End: 2021-06-17
Payer: COMMERCIAL

## 2021-06-17 DIAGNOSIS — E61.1 IRON DEFICIENCY: Primary | ICD-10-CM

## 2021-06-17 DIAGNOSIS — E61.1 IRON DEFICIENCY: ICD-10-CM

## 2021-06-17 LAB
ABSOLUTE EOS #: 0.1 K/UL (ref 0–0.44)
ABSOLUTE IMMATURE GRANULOCYTE: 0.02 K/UL (ref 0–0.3)
ABSOLUTE LYMPH #: 1.31 K/UL (ref 1.1–3.7)
ABSOLUTE MONO #: 0.45 K/UL (ref 0.1–1.2)
BASOPHILS # BLD: 1 % (ref 0–2)
BASOPHILS ABSOLUTE: 0.06 K/UL (ref 0–0.2)
DIFFERENTIAL TYPE: NORMAL
EOSINOPHILS RELATIVE PERCENT: 2 % (ref 1–4)
FERRITIN: 25 UG/L (ref 13–150)
HCT VFR BLD CALC: 46.8 % (ref 36.3–47.1)
HEMOGLOBIN: 15 G/DL (ref 11.9–15.1)
IMMATURE GRANULOCYTES: 0 %
IRON SATURATION: 24 % (ref 20–55)
IRON: 81 UG/DL (ref 37–145)
LYMPHOCYTES # BLD: 26 % (ref 24–43)
MCH RBC QN AUTO: 30.2 PG (ref 25.2–33.5)
MCHC RBC AUTO-ENTMCNC: 32.1 G/DL (ref 28.4–34.8)
MCV RBC AUTO: 94.4 FL (ref 82.6–102.9)
MONOCYTES # BLD: 9 % (ref 3–12)
NRBC AUTOMATED: 0 PER 100 WBC
PDW BLD-RTO: 12.5 % (ref 11.8–14.4)
PLATELET # BLD: 192 K/UL (ref 138–453)
PLATELET ESTIMATE: NORMAL
PMV BLD AUTO: 10 FL (ref 8.1–13.5)
RBC # BLD: 4.96 M/UL (ref 3.95–5.11)
RBC # BLD: NORMAL 10*6/UL
SEG NEUTROPHILS: 62 % (ref 36–65)
SEGMENTED NEUTROPHILS ABSOLUTE COUNT: 3.16 K/UL (ref 1.5–8.1)
TOTAL IRON BINDING CAPACITY: 340 UG/DL (ref 250–450)
UNSATURATED IRON BINDING CAPACITY: 259 UG/DL (ref 112–347)
WBC # BLD: 5.1 K/UL (ref 3.5–11.3)
WBC # BLD: NORMAL 10*3/UL

## 2021-06-17 PROCEDURE — 82728 ASSAY OF FERRITIN: CPT

## 2021-06-17 PROCEDURE — 85025 COMPLETE CBC W/AUTO DIFF WBC: CPT

## 2021-06-17 PROCEDURE — 36415 COLL VENOUS BLD VENIPUNCTURE: CPT

## 2021-06-17 PROCEDURE — 83550 IRON BINDING TEST: CPT

## 2021-06-17 PROCEDURE — 83540 ASSAY OF IRON: CPT

## 2021-06-24 ENCOUNTER — OFFICE VISIT (OUTPATIENT)
Dept: ONCOLOGY | Age: 66
End: 2021-06-24
Payer: COMMERCIAL

## 2021-06-24 ENCOUNTER — TELEPHONE (OUTPATIENT)
Dept: ONCOLOGY | Age: 66
End: 2021-06-24

## 2021-06-24 VITALS
BODY MASS INDEX: 28.71 KG/M2 | RESPIRATION RATE: 16 BRPM | WEIGHT: 162.1 LBS | SYSTOLIC BLOOD PRESSURE: 137 MMHG | HEART RATE: 95 BPM | DIASTOLIC BLOOD PRESSURE: 76 MMHG | TEMPERATURE: 97.5 F

## 2021-06-24 DIAGNOSIS — K90.49 MALABSORPTION DUE TO INTOLERANCE, NOT ELSEWHERE CLASSIFIED: Primary | ICD-10-CM

## 2021-06-24 PROCEDURE — G8427 DOCREV CUR MEDS BY ELIG CLIN: HCPCS | Performed by: INTERNAL MEDICINE

## 2021-06-24 PROCEDURE — 1036F TOBACCO NON-USER: CPT | Performed by: INTERNAL MEDICINE

## 2021-06-24 PROCEDURE — G8400 PT W/DXA NO RESULTS DOC: HCPCS | Performed by: INTERNAL MEDICINE

## 2021-06-24 PROCEDURE — 99214 OFFICE O/P EST MOD 30 MIN: CPT | Performed by: INTERNAL MEDICINE

## 2021-06-24 PROCEDURE — 99211 OFF/OP EST MAY X REQ PHY/QHP: CPT | Performed by: INTERNAL MEDICINE

## 2021-06-24 PROCEDURE — 1090F PRES/ABSN URINE INCON ASSESS: CPT | Performed by: INTERNAL MEDICINE

## 2021-06-24 PROCEDURE — 1123F ACP DISCUSS/DSCN MKR DOCD: CPT | Performed by: INTERNAL MEDICINE

## 2021-06-24 PROCEDURE — 3017F COLORECTAL CA SCREEN DOC REV: CPT | Performed by: INTERNAL MEDICINE

## 2021-06-24 PROCEDURE — G8419 CALC BMI OUT NRM PARAM NOF/U: HCPCS | Performed by: INTERNAL MEDICINE

## 2021-06-24 PROCEDURE — 4040F PNEUMOC VAC/ADMIN/RCVD: CPT | Performed by: INTERNAL MEDICINE

## 2021-06-24 NOTE — TELEPHONE ENCOUNTER
JOSE ARRIVES AMBULATORY FOR MD VISIT  DR Vila Pro IN TO SEE PATIENT  ORDERS RECEIVED  RV 12 MONTHS W/LABS BEFORE RV  LABS CDP FE TIBC FERRITIN TO BE DONE 1 WEEK PRIOR TO RV  AVS PRINTED AND PUT INTO CALL FOLDER TO SCHEDULE F/U FOR June 2022  AVS PRINTED AND GIVEN TO PATIENT WITH INSTRUCTIONS  PATIENT DISCHARGED AMBULATORY

## 2021-07-05 NOTE — PROGRESS NOTES
_           Chief Complaint   Patient presents with    Follow-up    Discuss Labs     DIAGNOSIS:       Severe microcytic anemia  Severe iron deficiency  Intolerable side effects to oral iron  Gastroparesis  Constipation  Hemorrhoids  Anal fissure      CURRENT THERAPY:         IV iron infusion February 2018    BRIEF CASE HISTORY:      Ms. Reyes Handing is a very pleasant 74 y.o. female with history of multiple comorbidities as listed. She had history of GERD and history of gastroparesis. In addition she had chronic constipation and problems with hemorrhoids bleeding anal fissure. She had GI workup about 2 years ago and was negative. Patient continued to have anemia. She was started on oral iron but she could not tolerate it. She had gastric upset and she had constipation. Patient complains of increasing weakness and fatigue. She feels tired. He has shortness of breath on exertion. No palpitation. She has history of hemorrhoids with occasional bleeding as stated above but no other source of bleeding. No melena or hematochezia. No hematemesis. No vaginal bleeding. No weight loss or decreased appetite. No other complaints. INTERIM HISTORY:   The patient seen for follow-up iron deficiency anemia. She had IV iron infusion in February 2018. She did very well. Hemoglobin is normal.    She continued to feel tired occasionally. No dizziness. No chest pain or palpitation. No active bleeding. PAST MEDICAL HISTORY: has a past medical history of Anxiety, Arthritis, Depression, Fibromyalgia, GERD (gastroesophageal reflux disease), Hyperlipidemia, Seizures ([address] and Vertigo, constant. PAST SURGICAL HISTORY: has a past surgical history that includes Tubal ligation; lumbar fusion; Carpal tunnel release (Bilateral); Cholecystectomy; knee surgery (Right); and cervical fusion.      CURRENT MEDICATIONS:  has a current medication list which includes the following prescription(s): melatonin-pyridoxine, tresiba flextouch, magnesium gluconate, acetaminophen, therapeutic multivitamin-minerals, insulin aspart, biotin, buspirone, milk thistle, pantoprazole, trulicity, promethazine, mupirocin, pitavastatin, benazepril, aspirin low dose, tizanidine, metformin, levetiracetam, duloxetine, cranberry-vitamin c-vitamin e, and vortioxetine. ALLERGIES:  is allergic to latex, amantadines, codeine, darvocet a500 [propoxyphene n-acetaminophen], depakote [valproic acid], dye [iodides], loratadine, lyrica [pregabalin], nabumetone, pamelor [nortriptyline], sanctura [trospium], and wellbutrin [bupropion]. FAMILY HISTORY: Negative for any hematological or oncological conditions. SOCIAL HISTORY:  reports that she has never smoked. She has never used smokeless tobacco. She reports that she does not drink alcohol and does not use drugs. REVIEW OF SYSTEMS:     · General: Positive for weakness and fatigue. No unanticipated weight loss or decreased appetite. No fever or chills. · Eyes: No blurred vision, eye pain or double vision. · Ears: No hearing problems or drainage. No tinnitus. · Throat: No sore throat, problems with swallowing or dysphagia. · Respiratory: No cough, sputum or hemoptysis. Positive for exertional shortness of breath. No pleuritic chest pain. · Cardiovascular: No chest pain, orthopnea or PND. No lower extremity edema. No palpitation. · Gastrointestinal: History of gastroparesis. No problems with swallowing. No abdominal pain or bloating. No nausea or vomiting. Positive for constipation. No GI bleeding. · Genitourinary: No dysuria, hematuria, frequency or urgency. · Musculoskeletal: No muscle aches or pains. No limitation of movement. No back pain. No gait disturbance, No joint complaints. · Dermatologic: No skin rashes or pruritus. No skin lesions or discolorations.    · Psychiatric: No depression, anxiety, or stress or signs of schizophrenia. No change in mood or affect. · Hematologic: No history of bleeding tendency. No bruises or ecchymosis. No history of clotting problems. · Infectious disease: No fever, chills or frequent infections. · Endocrine: No problems with opacity. No polydipsia or polyuria. No temperature intolerance. · Neurologic: No headaches or dizziness. No weakness or numbness of the extremities. No changes in balance, coordination,  memory, mentation, behavior. · Allergic/Immunologic: No nasal congestion or hives. No repeated infections. PHYSICAL EXAM:  The patient is not in acute distress. Vital signs: Blood pressure 137/76, pulse 95, temperature 97.5 °F (36.4 °C), temperature source Temporal, resp. rate 16, weight 162 lb 1.6 oz (73.5 kg). HEENT:  Eyes are normal. Ears, nose and throat are normal.  Neck: Supple. No lymph node enlargement. No thyroid enlargement. Trachea is centrally located. Chest:  Clear to auscultation. No wheezes or crepitations. Heart: Regular sinus rhythm. Abdomen: Soft, nontender. No hepatosplenomegaly. No masses. Extremities:  With no edema. Lymph Nodes:  No cervical, axillary or inguinal lymph node enlargement. Neurologic:  Conscious and oriented. No focal neurological deficits. Psychosocial: No depression, anxiety or stress. Skin: No rashes, bruises or ecchymoses.       Review of Diagnostic data:   Recent Labs     06/17/21  1415   WBC 5.1   HGB 15.0   HCT 46.8   MCV 94.4          Chemistry        Component Value Date/Time     10/25/2016 1145    K 4.0 10/25/2016 1145     10/25/2016 1145    CO2 26 10/25/2016 1145    BUN 15 10/25/2016 1145    CREATININE 0.72 10/25/2016 1145        Component Value Date/Time    CALCIUM 10.0 10/25/2016 1145    ALKPHOS 104 09/13/2016 1515    AST 24 09/13/2016 1515    ALT 18 09/13/2016 1515    BILITOT 0.28 (L) 09/13/2016 1515        Lab Results   Component Value Date    IRON 81 06/17/2021 TIBC 340 06/17/2021    FERRITIN 25 06/17/2021     Labs from outside facility showed evidence of severe microcytic anemia with low iron and low ferritin level. IMPRESSION:   Severe microcytic anemia  Severe iron deficiency  Intolerable side effects to oral iron  Gastroparesis  Constipation  Hemorrhoids  Anal fissure    PLAN: I reviewed the labs as above and discussed with the patient. I explained to the patient the nature of this hematologic problem. I explained the significance of these abnormalities in layman language. Reviewing lab tests or systemic outside facility showed evidence of chronic microcytic anemia. She was treated for iron deficiency anemia in the past with variable results. She could not tolerate the oral iron. She was given IV iron infusion. Repeated labs showed excellent response to treatment. Hemoglobin is now normal.  Iron studies are normal.   Patient continues to feel slightly tired. I think this is related to different etiology. At this point recommendation is for monitoring. We will repeat the labs with iron studies in 12 months. Patient's questions were answered to the best of her satisfaction and she verbalized full understanding and agreement.

## 2022-04-27 ENCOUNTER — HOSPITAL ENCOUNTER (OUTPATIENT)
Dept: PHYSICAL THERAPY | Facility: CLINIC | Age: 67
Setting detail: THERAPIES SERIES
Discharge: HOME | End: 2022-04-27
Payer: COMMERCIAL

## 2022-04-27 PROCEDURE — 97161 PT EVAL LOW COMPLEX 20 MIN: CPT
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
## 2022-05-09 ENCOUNTER — HOSPITAL ENCOUNTER (OUTPATIENT)
Dept: PHYSICAL THERAPY | Facility: CLINIC | Age: 67
Setting detail: THERAPIES SERIES
Discharge: HOME | End: 2022-05-09
Payer: COMMERCIAL

## 2022-05-09 PROCEDURE — 97110 THERAPEUTIC EXERCISES: CPT

## 2022-05-09 NOTE — FLOWSHEET NOTE
[] [facility]) [facility] &  Therapy  [address]  [phone]  F: [phone] [x] [address]  [facility]   [address]  P: [phone]  F: [phone] [] [address]  [address]  P: [phone]  F: [phone] [] [address]  P: [phone]  F: [phone] [] [address]  [facility]   [address]   [address]: [phone]  F: [phone]      Physical Therapy Daily Treatment Note    Date:  2022  Patient Name:  Maura Hazel    :  1955  MRN: [id number]  Physician: Dalton Pearson MD                Insurance: [facility] *12vs approved, 22 - 22  Medical Diagnosis:   M47.816 (ICD-10-CM) - Spondylosis without myelopathy or radiculopathy, lumbar region   M47.892 (ICD-10-CM) - Other spondylosis, cervical region   M48.02 (ICD-10-CM) - Spinal stenosis, cervical region   M54.16 (ICD-10-CM) - Radiculopathy, lumbar region   Rehab Codes: M47.816, M47.892, M48.02, M54.16, M62.81, R29.3  Onset Date: referral 22             Next 's appt. : tbd      Visit# / total visits:     Cancels/No Shows:     Subjective:    Pain:  [x] Yes  [] No Location: neck, back, B shoulders  Pain Rating: (0-10 scale) 5/10 in neck and B shoulders; 3-4/10 LBP   Pain altered Tx:  [x] No  [] Yes  Action:  Comments: Pt arrives noting her neck and shoulders are the most bothersome this date. Denies any N/T.      Objective:  Modalities:   Precautions:  Exercises:  Exercise bilat Reps/ Time Weight/ Level Comments   seated      C-AROM 10x5\" ea  Rotation, side bend    lumbar flexion 10x     lumbar rotation 10x     Side bend stretch  5x10\"     Retro shoulder rolls  10x     Marches  10x  Notes slight irritation in R hip/LB   Scaption 10x A    Flex, abd  10x ea A          Supine       LTR 10x     HS stretch  3x20\" Strap     SKTC 5x5-10\"     TrA 10x5\"  Hand as tactile cue    Marches with TrA 10x     SLR's with TrA 10x     Bridges  10x           Side lying       Open books 10x     Clamshells  10x2     Reverse clamshells 10x           Standing       IR stretch with towel             Other:     Specific Instructions for next treatment: restore cervical and lumbar mobility, global strengthening (core, UE, LE, scapular), reinforce postural education, MHP prn, monitor for centralization of symptoms     Treatment Charges: Mins Units   []  Modalities     [x]  Ther Exercise 45 3   []  Manual Therapy     []  Ther Activities     []  Aquatics     []  Vasocompression     []  Other     Total Treatment time 45 3       Assessment: [x] Progressing toward goals. Initiated session with cervical and lumbar stretches to improve overall mobility. Instructed throughout TrA and incorporated DLS program. Encouraged to maintain a pain free ROM throughout session. Tactile cues for proper pelvic alignment during side lying clamshells. Completed shoulder AROM exercises in seated with good tolerance. Educated pt on postural awareness and exercises to help address posture deficits. Issued and reviewed HEP, pt ensures understanding. Will monitor sx's and progress strengthening as tolerated. [] No change. [] Other:  [x] Patient would continue to benefit from skilled physical therapy services in order to:  reduce pain, restore mobility, and improve strength globally to ease difficulty and improve independence with all daily activities and improve patient participation in recreational activities for QOL.        STG/LTG  Problems:    [x]? ? Pain: up to 10/10 pain neck, back, BUEs  [x]? ? ROM: reduced cervical ROM globally, lumbar sidebend and rotation, shoulder IR  [x]? ? Strength: impaired bilateral shoulder, hip, and core strength globally  [x]? ?  Function: impaired function indicated by NDI score of 52% and oswestry score of 64%   [x]? Other:  Impaired gait, impaired posture      STG: (to be met in 8 treatments)  1. ? Pain: Pt to reduce neck and back pain levels to 7/10 or less to improve tolerance to therapeutic exercise progressions. a. Pt to report at least a 25% improvement in UE radicular symptoms to ease difficulty with lifting and carrying related tasks. 2. ? ROM: Patient to demonstrate improved pain-free, cervical AROM, by at least 5 degrees each direction to ease difficulty with ADLs. a. Pt to demonstrate full, pain-free,  lumbar AROM globally to ease difficulty with all daily activities. 3. ? Strength: Pt to demonstrate improved bilateral shoulder strength to 4+/5 globally to ease difficulty with heavier lifting and carrying tasks. a. Pt to demonstrate improved hip strength to at least 4+/5 globally to reduce difficulty with prolonged standing/walking/stair climbing. 4. ? Function: Pt to demonstrate ability to sit for at least 30 minutes with improved postural mechanics and without increased neck or back pain. 5. Patient to be independent with home exercise program as demonstrated by performance with correct form without cues.     LTG: (to be met in 12 treatments)  1. Pt to reduce neck and back pain levels to 4/10 or less to improve independence with household chores including cleaning and cooking. a. Pt to report at least a 50% reduction in BUE radicular symptoms to improve participation in recreational activities such as sewing and crocheting. 2. Pt to demonstrate improved functional mobility with NDI score of 40% impaired or less. 3. Pt to demonstrate improve functional mobility with Oswestry score of 50% impaired or less. 4. Pt to demonstrate ability to stand for at least 20 minutes with improved postural mechanics and without pain to reduce difficulty with cooking and cleaning.    5. Pt to demonstrate ability to ambulate at least 300' with improved gait mechanics and without pain to improve community access.         Patient goals: reduce pain, improve function    Pt. Education:  [x] Yes  [] No  [x] Reviewed Prior HEP/Ed  Method of Education: [x] Verbal  [] Demo  [x] Written issued HEP 5/9/22  Access Code: [facility] [place]  URL: [email or web address]  Date: 05/09/2022  Prepared by:    Exercises  Seated Cervical Sidebending AROM - 1 x daily - 5 x weekly - 1 sets - 10 reps - 5-10\" hold  Seated Cervical Rotation AROM - 1 x daily - 5 x weekly - 1 sets - 10 reps - 5-10\" hold  Seated Scapular Retraction - 1 x daily - 5 x weekly - 2 sets - 10 reps  Shoulder Rolls in Sitting - 1 x daily - 5 x weekly - 2 sets - 10 reps  Seated Shoulder Flexion - 1 x daily - 5 x weekly - 2 sets - 10 reps  Seated March - 1 x daily - 5 x weekly - 2 sets - 10 reps  Supine Hamstring Stretch with Strap - 1 x daily - 5 x weekly - 3 sets - 20-30 sec hold  Supine Lower Trunk Rotation - 1 x daily - 5 x weekly - 2 sets - 10 reps  Supine Bridge - 1 x daily - 5 x weekly - 2 sets - 10 reps  Supine Active Straight Leg Raise - 1 x daily - 5 x weekly - 2 sets - 10 reps  Supine Transversus Abdominis Bracing - Hands on Stomach - 1 x daily - 5 x weekly - 2 sets - 10 reps - 5 sec hold  Clamshell - 1 x daily - 5 x weekly - 2 sets - 10 reps    Comprehension of Education:  [x] Verbalizes understanding. [] Demonstrates understanding. [x] Needs review. [] Demonstrates/verbalizes HEP/Ed previously given. Plan: [x] Continue current frequency toward long and short term goals.     [] Specific Instructions for subsequent treatments:       Time In: 11:00            Time Out: 11:45    Electronically signed by:  Satya William PTA

## 2022-05-11 ENCOUNTER — HOSPITAL ENCOUNTER (OUTPATIENT)
Dept: PHYSICAL THERAPY | Facility: CLINIC | Age: 67
Setting detail: THERAPIES SERIES
Discharge: HOME | End: 2022-05-11
Payer: COMMERCIAL

## 2022-05-11 PROCEDURE — 97110 THERAPEUTIC EXERCISES: CPT

## 2022-05-11 NOTE — FLOWSHEET NOTE
[] [facility]) - [facility] &  Therapy  [address]  [phone]  F: [phone] [x] [address]  [facility]   [address]  P: [phone]  F: [phone] [] [address]  [address]  P: [phone]  F: [phone] [] [address]  P: [phone]  F: [phone] [] [address]  [facility]   [address]   [place]: [phone]  F: [phone]      Physical Therapy Daily Treatment Note    Date:  2022  Patient Name:  Kellie Cat    :  1955  MRN: [id number]  Physician: Jacquelyn Michelle MD                Insurance: Cyn Urena *12vs approved, 22 - 22  Medical Diagnosis:   M47.816 (ICD-10-CM) - Spondylosis without myelopathy or radiculopathy, lumbar region   M47.892 (ICD-10-CM) - Other spondylosis, cervical region   M48.02 (ICD-10-CM) - Spinal stenosis, cervical region   M54.16 (ICD-10-CM) - Radiculopathy, lumbar region   Rehab Codes: M47.816, M47.892, M48.02, M54.16, M62.81, R29.3  Onset Date: referral 22             Next 's appt. : tbd      Visit# / total visits: 3/12    Cancels/No Shows:     Subjective:    Pain:  [x] Yes  [] No Location: neck, back, B shoulders  Pain Rating: (0-10 scale) 6-7/10 in neck and B shoulders; 6-7/10 LBP  Pain altered Tx:  [] No  [x] Yes  Action: minimal progressions due to soreness after last session   Comments: Pt arrives noting soreness following previous session. Pt had to use lidocane patch on back as well to help with soreness. Slightly increased pain this date.      Objective:  Modalities:   Precautions:  Exercises:  Exercise bilat Reps/ Time Weight/ Level Comments   seated      C-AROM 10x5\" ea  Rotation, side bend    PB roll outs  5x5\"  New    Scapular retraction 10x3\"  New 5/11   lumbar flexion 10x     lumbar rotation 10x     Side bend stretch  5x10\"     Retro shoulder rolls  10x     Marches  10x  Notes slight irritation in R hip/LB   Scaption  10x A    Flex, abd  10x ea A          Supine       LTR 10x     HS stretch  3x20\" Strap     SKTC 5x5-10\"     TrA 10x5\"  Hand as tactile cue    Marches with TrA 10x     SLR's with TrA 10x     Bridges  10x           Side lying       Open books 10x     Clamshells  10x2 A    Reverse clamshells 10x A          Standing       IR stretch with towel             Other:     Specific Instructions for next treatment: restore cervical and lumbar mobility, global strengthening (core, UE, LE, scapular), reinforce postural education, MHP prn, monitor for centralization of symptoms     Treatment Charges: Mins Units   []  Modalities     [x]  Ther Exercise 44 3   []  Manual Therapy     []  Ther Activities     []  Aquatics     []  Vasocompression     []  Other     Total Treatment time 44 3       Assessment: [] Progressing toward goals. [] No change. [x] Other: Continued with exercise regimine initiated at previous session with minimal progressions due to pt complaint of increased soreness following last session. Cues provided throughout session to encourage movement within a pain free range and to encourage diaphragmatic breathing with good carryover. Difficulty performing scapular retraction without UT compensation despite cues. Pt demonstrates good recall of HEP with ability to demonstrate exercises with minimal cueing for appropriate technique. [x] Patient would continue to benefit from skilled physical therapy services in order to:  reduce pain, restore mobility, and improve strength globally to ease difficulty and improve independence with all daily activities and improve patient participation in recreational activities for QOL.        STG/LTG  Problems:    [x]? ? Pain: up to 10/10 pain neck, back, BUEs  [x]?  ? ROM: reduced cervical ROM globally, lumbar sidebend and rotation, shoulder IR  [x]? ? Strength: impaired bilateral shoulder, hip, and core strength globally  [x]? ? Function: impaired function indicated by NDI score of 52% and oswestry score of 64%   [x]? Other:  Impaired gait, impaired posture      STG: (to be met in 8 treatments)  1. ? Pain: Pt to reduce neck and back pain levels to 7/10 or less to improve tolerance to therapeutic exercise progressions. a. Pt to report at least a 25% improvement in UE radicular symptoms to ease difficulty with lifting and carrying related tasks. 2. ? ROM: Patient to demonstrate improved pain-free, cervical AROM, by at least 5 degrees each direction to ease difficulty with ADLs. a. Pt to demonstrate full, pain-free,  lumbar AROM globally to ease difficulty with all daily activities. 3. ? Strength: Pt to demonstrate improved bilateral shoulder strength to 4+/5 globally to ease difficulty with heavier lifting and carrying tasks. a. Pt to demonstrate improved hip strength to at least 4+/5 globally to reduce difficulty with prolonged standing/walking/stair climbing. 4. ? Function: Pt to demonstrate ability to sit for at least 30 minutes with improved postural mechanics and without increased neck or back pain. 5. Patient to be independent with home exercise program as demonstrated by performance with correct form without cues.     LTG: (to be met in 12 treatments)  1. Pt to reduce neck and back pain levels to 4/10 or less to improve independence with household chores including cleaning and cooking. a. Pt to report at least a 50% reduction in BUE radicular symptoms to improve participation in recreational activities such as sewing and crocheting. 2. Pt to demonstrate improved functional mobility with NDI score of 40% impaired or less. 3. Pt to demonstrate improve functional mobility with Oswestry score of 50% impaired or less.    4. Pt to demonstrate ability to stand for at least 20 minutes with improved postural mechanics and without pain to reduce difficulty with cooking and cleaning. 5. Pt to demonstrate ability to ambulate at least 300' with improved gait mechanics and without pain to improve community access.         Patient goals: reduce pain, improve function    Pt. Education:  [x] Yes  [] No  [x] Reviewed Prior HEP/Ed  Method of Education: [x] Verbal  [x] Demo  [x] Written   Access Code: [email or web address]  URL: [email or web address]  Date: 05/09/2022  Prepared by:    Exercises  Seated Cervical Sidebending AROM - 1 x daily - 5 x weekly - 1 sets - 10 reps - 5-10\" hold  Seated Cervical Rotation AROM - 1 x daily - 5 x weekly - 1 sets - 10 reps - 5-10\" hold  Seated Scapular Retraction - 1 x daily - 5 x weekly - 2 sets - 10 reps  Shoulder Rolls in Sitting - 1 x daily - 5 x weekly - 2 sets - 10 reps  Seated Shoulder Flexion - 1 x daily - 5 x weekly - 2 sets - 10 reps  Seated March - 1 x daily - 5 x weekly - 2 sets - 10 reps  Supine Hamstring Stretch with Strap - 1 x daily - 5 x weekly - 3 sets - 20-30 sec hold  Supine Lower Trunk Rotation - 1 x daily - 5 x weekly - 2 sets - 10 reps  Supine Bridge - 1 x daily - 5 x weekly - 2 sets - 10 reps  Supine Active Straight Leg Raise - 1 x daily - 5 x weekly - 2 sets - 10 reps  Supine Transversus Abdominis Bracing - Hands on Stomach - 1 x daily - 5 x weekly - 2 sets - 10 reps - 5 sec hold  Clamshell - 1 x daily - 5 x weekly - 2 sets - 10 reps    Date: 05/11/2022  Prepared by: Lisa Roberson    Exercises  Sidelying Thoracic Rotation with Open Book - 1 x daily - 7 x weekly - 1 sets - 10 reps  Sidelying Reverse Clamshell - 1 x daily - 7 x weekly - 1 sets - 10 reps  Supine Single Knee to Chest Stretch - 1 x daily - 7 x weekly - 5 sets - 10\" hold    Comprehension of Education:  [x] Verbalizes understanding. [x] Demonstrates understanding. [x] Needs review. [x] Demonstrates/verbalizes HEP/Ed previously given.      Plan: [x] Continue current frequency toward long and short term goals.     [x] Specific Instructions for subsequent treatments: progress as able       Time In: 12:00            Time Out: 12:44 pm    Electronically signed by:  Lavern Green PT

## 2022-05-16 ENCOUNTER — HOSPITAL ENCOUNTER (OUTPATIENT)
Dept: PHYSICAL THERAPY | Facility: CLINIC | Age: 67
Setting detail: THERAPIES SERIES
Discharge: HOME | End: 2022-05-16
Payer: COMMERCIAL

## 2022-05-16 NOTE — FLOWSHEET NOTE
.  [] [address]  [address]    [phone]  F: [phone]   [x] [address]  [facility] 36   [address]  P: [phone]  F: [phone]  [] [address]  [address] [facility]  P: [phone]  F: [phone] [] [address] [facility]  P: [phone]  F: [phone]  [] [address]  [address] [facility] [address]   [address]   [place]: [phone]  F: [phone]   [] [facility]  [address] [facility] [address]  [place]: [phone]   F: [phone]     Physical Therapy Cancel/No Show note    Date: 2022  Patient: Daniel Miles  : 1955  MRN: [id number]    Cancels/No Shows to date:     For today's appointment patient:    [x]  Cancelled    [] Rescheduled appointment    [] No-show     Reason given by patient:    []  Patient ill    []  Conflicting appointment    [] No transportation      [] Conflict with work    [] No reason given    [] Weather related    [] COVID-19    [x] Other:      Comments: Pt CX stating she can't walk and is going to the hospital.         [x] Next appointment was previously confirmed    Electronically signed by: Nicole Reynoso PTA

## 2022-05-18 ENCOUNTER — HOSPITAL ENCOUNTER (OUTPATIENT)
Dept: PHYSICAL THERAPY | Facility: CLINIC | Age: 67
Setting detail: THERAPIES SERIES
Discharge: HOME | End: 2022-05-18
Payer: COMMERCIAL

## 2022-05-18 NOTE — FLOWSHEET NOTE
[] [facility]) [facility] &  Therapy  [address]    [phone]  F: [phone]   [x] [address]  [facility]   [address]  P: [phone]  F: [phone]  [] [address]  [address]  P: [phone]  F: [phone] [] [address]  P: [phone]  F: [phone]  [] [address]  [address] [facility] [address]   [address]   [place]: [phone]  F: [phone]   [] [facility]  [address] [facility] [address]  [place]: [phone]   F: [phone]     Physical Therapy Cancel/No Show note    Date: 2022  Patient: Cynthia Liang  : 1955  MRN: [id number]    Cancels/No Shows to date: 0    For today's appointment patient:    [x]  Cancelled    [] Rescheduled appointment    [] No-show     Reason given by patient:    []  Patient ill    []  Conflicting appointment    [] No transportation      [] Conflict with work    [] No reason given    [] Weather related    [] COVID-19    [x] Other:      Comments:  Pt prescribed muscle relaxants after \"throwing out her back\"      [x] Next appointment was confirmed    Electronically signed by: Margherita Leyden, PT

## 2022-05-23 ENCOUNTER — HOSPITAL ENCOUNTER (OUTPATIENT)
Dept: PHYSICAL THERAPY | Facility: CLINIC | Age: 67
Setting detail: THERAPIES SERIES
Discharge: HOME | End: 2022-05-23
Payer: COMMERCIAL

## 2022-05-23 PROCEDURE — 97110 THERAPEUTIC EXERCISES: CPT

## 2022-05-23 NOTE — FLOWSHEET NOTE
[] [facility]) - [facility] &  Therapy  [address]  [phone]  F: [phone] [x] [address]  [facility]   [address]  P: [phone]  F: [phone] [] [address]  [address]  P: [phone]  F: [phone] [] [address]  P: [phone]  F: [phone] [] [address]  [facility]   [address]   [place]: [phone]  F: [phone]      Physical Therapy Daily Treatment Note    Date:  2022  Patient Name:  Luciana Champion    :  1955  MRN: [id number]  Physician: Ryley Yan MD                Insurance: Anika Lai *12vs approved, 22 - 22  Medical Diagnosis:   M47.816 (ICD-10-CM) - Spondylosis without myelopathy or radiculopathy, lumbar region   M47.892 (ICD-10-CM) - Other spondylosis, cervical region   M48.02 (ICD-10-CM) - Spinal stenosis, cervical region   M54.16 (ICD-10-CM) - Radiculopathy, lumbar region   Rehab Codes: M47.816, M47.892, M48.02, M54.16, M62.81, R29.3  Onset Date: referral 22             Next 's appt. : tbd      Visit# / total visits:     Cancels/No Shows: 2/0    Subjective:    Pain:  [x] Yes  [] No Location: neck, back, B shoulders  Pain Rating: (0-10 scale) minimal in neck and B shoulders; 7-8/10 LBP  Pain altered Tx:  [] No  [x] Yes  Action: emphasized neck and shoulder/scapular strengthening and mobility this date  Comments: Pt arrives with increased low back pain this date. Pt is unsure as to why but her back and through R anterior hip and leg were in significant pain last week and she had to go see her doctor where she was given muscle relaxants. Pt notes this has helped a little but continues to experience significant discomfort.  Pt notes minimal pain in neck this date. Objective:  Modalities:  MHP to low back during seated exercises   Precautions:  Exercises: bold exercises performed 05/23/22  Exercise bilat Reps/ Time Weight/ Level Comments   seated      C-AROM 10x5\" ea  Rotation, side bend    PB roll outs  5x5\"  New 5/11   UT stretch 3x15\"     Scapular retraction 10x3\"  New 5/11   Chin tucks  20x3\"  New 5/23   lumbar flexion 10x     lumbar rotation 10x     Side bend stretch  5x10\"     Retro shoulder rolls  10x2     Marches  10x  Notes slight irritation in R hip/LB   Scaption  10x A    Flex, abd  10x ea A    B. ER  10x [place]  New 5/23   B. H abd  10x [place]  New 5/23         Supine       LTR 10x     HS stretch  3x20\" Strap     SKTC 5x5-10\"     TrA 10x5\"  Hand as tactile cue    Marches with TrA 10x     SLR's with TrA 10x     Bridges  10x           Side lying       Open books 10x     Clamshells  10x2 A    Reverse clamshells 10x A          Standing       IR stretch with towel  5x10\"     TA+ row 10x orange    TA + ext 10x orange    Other:  Functional Test: NDI; Oswestry Score:  Score: at 4th visit    NDI 26/50 or 52% functionally impaired at initial eval 26/50 or 52% functionally impaired    Oswestry 32/50 or 64% functionally impaired at initial eval 34/50 or 68% impaired          STRENGTH   STRENGTH   ROM     Left Right   Left Right Cervical     C5 Shld Abd 4-* 4* L1-2 Hip Flex 4 4 Flexion 30*   Shld Flexion 4* 4* Hip Abd 4 4- Extension 35*   Shld IR 4- 4- L3-4 Knee Ext 4+ 4+ Rotation L 65 R 60*   Shld ER 4-* 4- L4 Ankle DF 4+ 4+ Sidebend L 25* R 25*   C6 Elb Flex 4 4 L5 EHL     Retraction     C7 Elb Ext 4 4 S1 Plant.  Flex 4+ 4+ Lumbar     C8 EPL     Abdominals poor poor Flexion WFL *   T1 Fing Abd     Erector Spinae poor poor Extension WFL    60# 66#       Rotation L  Limtied 15% * R Limited 15% *         Hip ext  4- 4- Sidebend L Limited 25% * R Limited 25% *               UE/LE L R               Shoulder flexion 180 180               Shoulder abduction 180 180               Shoulder IR  L3* L3*               Shoulder ER                                   Specific Instructions for next treatment: restore cervical and lumbar mobility, global strengthening (core, UE, LE, scapular), reinforce postural education, MHP prn, monitor for centralization of symptoms     Treatment Charges: Mins Units   [x]  Modalities- MHP -- --   [x]  Ther Exercise 46 3   []  Manual Therapy     []  Ther Activities     []  Aquatics     []  Vasocompression     []  Other     Total Treatment time 46 3     *reassessment billed under therapeutic exercise     Assessment: [] Progressing toward goals. [] No change. [x] Other: Held supine/sidely and lumbar or hip related exercises this date due to LBP flare up last week that caused pt to cancel previous appointments. Able to progress shoulder/scapular and cervical strengthening and mobility exercises as charted above with good tolerance. Occasional cueing for core activation as pt would extend lumbar spine with scapular exercises causing slight increase in LBP and R hip pain. Cues intermittently to minimize UT compensation with good carryover. MHP applied to low back during seated exercises to improve tolerance due to increased pain this date. Advised pt to hold HEP for lumbar exercises and will gradually initiate again in clinic to assess tolerance. Encouraged pt to complete ADLs and resume walking for exercise at home as much as she can tolerate. Pt verbalizes understanding. Plan to resume gentle lumbar/hip exercises and assess tolerance at next session if appropriate. [x] Patient would continue to benefit from skilled physical therapy services in order to:  reduce pain, restore mobility, and improve strength globally to ease difficulty and improve independence with all daily activities and improve patient participation in recreational activities for QOL.        STG/LTG  Problems:    [x]? ? Pain: up to 10/10 pain neck, back, BUEs  [x]?  ? ROM: reduced cervical ROM globally, lumbar sidebend and rotation, shoulder IR  [x]? ? Strength: impaired bilateral shoulder, hip, and core strength globally  [x]? ? Function: impaired function indicated by NDI score of 52% and oswestry score of 64%   [x]? Other:  Impaired gait, impaired posture      STG: (to be met in 8 treatments) Reassessed by primary PT 5/23/22  1. ? Pain: Pt to reduce neck and back pain levels to 7/10 or less to improve tolerance to therapeutic exercise progressions. Ongoing  a. Pt to report at least a 25% improvement in UE radicular symptoms to ease difficulty with lifting and carrying related tasks. MET  2. ? ROM: Patient to demonstrate improved pain-free, cervical AROM, by at least 5 degrees each direction to ease difficulty with ADLs. Ongoing  a. Pt to demonstrate full, pain-free,  lumbar AROM globally to ease difficulty with all daily activities. Ongoing- limited globally    3. ? Strength: Pt to demonstrate improved bilateral shoulder strength to 4+/5 globally to ease difficulty with heavier lifting and carrying tasks. Ongoing- limited globally  a. Pt to demonstrate improved hip strength to at least 4+/5 globally to reduce difficulty with prolonged standing/walking/stair climbing. Ongoing- limited globally    4. ? Function: Pt to demonstrate ability to sit for at least 30 minutes with improved postural mechanics and without increased neck or back pain. Ongoing- inc LBP  5. Patient to be independent with home exercise program as demonstrated by performance with correct form without cues. MET     LTG: (to be met in 12 treatments)  1. Pt to reduce neck and back pain levels to 4/10 or less to improve independence with household chores including cleaning and cooking. a. Pt to report at least a 50% reduction in BUE radicular symptoms to improve participation in recreational activities such as sewing and crocheting.    2. Pt to demonstrate improved functional mobility with NDI score of 40% impaired or less. Ongoing   3. Pt to demonstrate improve functional mobility with Oswestry score of 50% impaired or less. 4. Pt to demonstrate ability to stand for at least 20 minutes with improved postural mechanics and without pain to reduce difficulty with cooking and cleaning. 5. Pt to demonstrate ability to ambulate at least 300' with improved gait mechanics and without pain to improve community access.         Patient goals: reduce pain, improve function    Pt. Education:  [x] Yes  [] No  [x] Reviewed Prior HEP/Ed  Method of Education: [x] Verbal  [x] Demo  [] Written   Access Code: [email or web address]  URL: [email or web address]  Date: 05/09/2022  Prepared by:    Exercises  Seated Cervical Sidebending AROM - 1 x daily - 5 x weekly - 1 sets - 10 reps - 5-10\" hold  Seated Cervical Rotation AROM - 1 x daily - 5 x weekly - 1 sets - 10 reps - 5-10\" hold  Seated Scapular Retraction - 1 x daily - 5 x weekly - 2 sets - 10 reps  Shoulder Rolls in Sitting - 1 x daily - 5 x weekly - 2 sets - 10 reps  Seated Shoulder Flexion - 1 x daily - 5 x weekly - 2 sets - 10 reps  Seated March - 1 x daily - 5 x weekly - 2 sets - 10 reps  Supine Hamstring Stretch with Strap - 1 x daily - 5 x weekly - 3 sets - 20-30 sec hold  Supine Lower Trunk Rotation - 1 x daily - 5 x weekly - 2 sets - 10 reps  Supine Bridge - 1 x daily - 5 x weekly - 2 sets - 10 reps  Supine Active Straight Leg Raise - 1 x daily - 5 x weekly - 2 sets - 10 reps  Supine Transversus Abdominis Bracing - Hands on Stomach - 1 x daily - 5 x weekly - 2 sets - 10 reps - 5 sec hold  Clamshell - 1 x daily - 5 x weekly - 2 sets - 10 reps    Date: 05/11/2022  Prepared by: Rach Conde    Exercises  Sidelying Thoracic Rotation with Open Book - 1 x daily - 7 x weekly - 1 sets - 10 reps  Sidelying Reverse Clamshell - 1 x daily - 7 x weekly - 1 sets - 10 reps  Supine Single Knee to Chest Stretch - 1 x daily - 7 x weekly - 5 sets - 10\" hold    Comprehension of Education:  [] Verbalizes understanding. [] Demonstrates understanding. [] Needs review. [x] Demonstrates/verbalizes HEP/Ed previously given. Plan: [x] Continue current frequency toward long and short term goals.     [x] Specific Instructions for subsequent treatments: progress as able       Time In: 12:02 pm            Time Out: 12:48 pm     Electronically signed by:  Karlee Acosta PT

## 2022-05-25 ENCOUNTER — HOSPITAL ENCOUNTER (OUTPATIENT)
Dept: PHYSICAL THERAPY | Facility: CLINIC | Age: 67
Setting detail: THERAPIES SERIES
Discharge: HOME | End: 2022-05-25
Payer: COMMERCIAL

## 2022-05-25 PROCEDURE — 97110 THERAPEUTIC EXERCISES: CPT

## 2022-05-25 NOTE — FLOWSHEET NOTE
[] [facility]) [facility]  Therapy  [address]  [phone]  F: [phone] [x] [address]  [facility]   [address]  P: [phone]  F: [phone] [] [address]  [address]  P: [phone]  F: [phone] [] [address]  P: [phone]  F: [phone] [] [address]  [facility]   [address]   [place] Aw: [phone]  F: [phone]      Physical Therapy Daily Treatment Note    Date:  2022  Patient Name:  Daniel Miles    :  1955  MRN: [id number]  Physician: Sanchez Joseph MD                Insurance: [address] [place] [address] approved, 22 - 22  Medical Diagnosis:   M47.816 (ICD-10-CM) - Spondylosis without myelopathy or radiculopathy, lumbar region   M47.892 (ICD-10-CM) - Other spondylosis, cervical region   M48.02 (ICD-10-CM) - Spinal stenosis, cervical region   M54.16 (ICD-10-CM) - Radiculopathy, lumbar region   Rehab Codes: M47.816, M47.892, M48.02, M54.16, M62.81, R29.3  Onset Date: referral 22             Next 's appt. : tbd      Visit# / total visits:     Cancels/No Shows: 2/0    Subjective:    Pain:  [x] Yes  [] No Location: neck, back, B shoulders  Pain Rating: (0-10 scale) 1/10 neck and B shoulders; 4-5/10 LBP  Pain altered Tx:  [x] No  [] Yes  Action:   Comments: Pt arrives noting stiffness in R hip down to knee. Minimal irritation in neck and shoulders noting they feel pretty good this date. Presents with reduced pain in LB noting she took a ms relaxer prior to PT.      Objective:  Modalities:  MHP to low back during supine stretches   Precautions:  Exercises: bold exercises performed 22  Exercise bilat Reps/ Time Weight/ Level Comments   seated      C-AROM 10x5\" ea Rotation, side bend    PB roll outs  5x5\"  New 5/11   UT stretch 3x15\"     Scapular retraction 10x3\"  New 5/11   Chin tucks  20x3\"  New 5/23   lumbar flexion 10x     lumbar rotation 10x     Side bend stretch  5x10\"     Retro shoulder rolls  10x     Marches  10x  Notes slight irritation in R hip/LB   Scaption  10x A Notes brief irritation in LB 'locking'   Flex, abd  10x ea A    B. ER  10x [place]  New 5/23   B. H abd  10x [place]  New 5/23         Supine    With HP to LB 5/25   Diaphragmatic breathing  10x     LTR 10x     HS stretch  3x15\" manual    SKTC 3x10\" Manual     PPT 10x  Added 5/25   TrA 10x5\"  Hand as tactile cue    Glute squeeze  10x5\"     Single knee fallout TrA 10x     Marches with TrA 10x     SLR's with TrA 10x     Bridges  10x           Side lying       Open books 10x     Clamshells  10x2 A    Reverse clamshells 10x A          Standing       IR stretch with towel  5x10\"     TA+ row 10x orange    TA + ext 10x orange    Other:  Functional Test: NDI; Oswestry Score:  Score: at 4th visit    NDI 26/50 or 52% functionally impaired at initial eval 26/50 or 52% functionally impaired    Oswestry 32/50 or 64% functionally impaired at initial eval 34/50 or 68% impaired          STRENGTH   STRENGTH   ROM     Left Right   Left Right Cervical     C5 Shld Abd 4-* 4* L1-2 Hip Flex 4 4 Flexion 30*   Shld Flexion 4* 4* Hip Abd 4 4- Extension 35*   Shld IR 4- 4- L3-4 Knee Ext 4+ 4+ Rotation L 65 R 60*   Shld ER 4-* 4- L4 Ankle DF 4+ 4+ Sidebend L 25* R 25*   C6 Elb Flex 4 4 L5 EHL     Retraction     C7 Elb Ext 4 4 S1 Plant.  Flex 4+ 4+ Lumbar     C8 EPL     Abdominals poor poor Flexion WFL *   T1 Fing Abd     Erector Spinae poor poor Extension WFL    60# 66#       Rotation L  Limtied 15% * R Limited 15% *         Hip ext  4- 4- Sidebend L Limited 25% * R Limited 25% *               UE/LE L R               Shoulder flexion 180 180               Shoulder abduction 180 180               Shoulder IR  L3* L3*               Shoulder ER                                   Specific Instructions for next treatment: restore cervical and lumbar mobility, global strengthening (core, UE, LE, scapular), reinforce postural education, MHP prn, monitor for centralization of symptoms     Treatment Charges: Mins Units   [x]  Modalities- MHP -- --   [x]  Ther Exercise 50 3   []  Manual Therapy     []  Ther Activities     []  Aquatics     []  Vasocompression     []  Other     Total Treatment time 50 3         Assessment: [x] Progressing toward goals. Initiated session with cervical stretches and exercises to improve UE strength. Resumed supine as pt presents with reduced pain levels and added HP to LB to promote relaxation. Applied manual stretches to hamstring and [facility] ensuring a slow, controlled motion and monitoring sx's. Guided through LTR and single knee fallout to ensure proper positioning and control. Rigid movement felt with R LE single knee fallout this date. Min aching in LB in supine but does alleviate when pt takes deep breaths and relaxes. Added PPT and glute squeezes this date requiring cues to ensure proper completion. Encouraged to engage core throughout session and to avoid holding her breath. Issued orange t band and updated HEP for continued shoulder strengthening at home. Pt notes feeling better and without pain at end of session but notices increased pain as her muscles become tense. Instructed through log roll technique with position changes and on postural awareness. [] No change. [x] Other: No future appts scheduled at this time as pt's authorization ends on 5/29/22. Instructed pt we will call her once new authorization is approved   [x] Patient would continue to benefit from skilled physical therapy services in order to:  reduce pain, restore mobility, and improve strength globally to ease difficulty and improve independence with all daily activities and improve patient participation in recreational activities for QOL.      STG/LTG  Problems:    [x]? ? Pain: up to 10/10 pain neck, back, BUEs  [x]? ? ROM: reduced cervical ROM globally, lumbar sidebend and rotation, shoulder IR  [x]? ? Strength: impaired bilateral shoulder, hip, and core strength globally  [x]? ? Function: impaired function indicated by NDI score of 52% and oswestry score of 64%   [x]? Other:  Impaired gait, impaired posture      STG: (to be met in 8 treatments) Reassessed by primary PT 5/23/22  1. ? Pain: Pt to reduce neck and back pain levels to 7/10 or less to improve tolerance to therapeutic exercise progressions. Ongoing  a. Pt to report at least a 25% improvement in UE radicular symptoms to ease difficulty with lifting and carrying related tasks. MET  2. ? ROM: Patient to demonstrate improved pain-free, cervical AROM, by at least 5 degrees each direction to ease difficulty with ADLs. Ongoing  a. Pt to demonstrate full, pain-free,  lumbar AROM globally to ease difficulty with all daily activities. Ongoing- limited globally    3. ? Strength: Pt to demonstrate improved bilateral shoulder strength to 4+/5 globally to ease difficulty with heavier lifting and carrying tasks. Ongoing- limited globally  a. Pt to demonstrate improved hip strength to at least 4+/5 globally to reduce difficulty with prolonged standing/walking/stair climbing. Ongoing- limited globally    4. ? Function: Pt to demonstrate ability to sit for at least 30 minutes with improved postural mechanics and without increased neck or back pain. Ongoing- inc LBP  5. Patient to be independent with home exercise program as demonstrated by performance with correct form without cues. MET     LTG: (to be met in 12 treatments)  1. Pt to reduce neck and back pain levels to 4/10 or less to improve independence with household chores including cleaning and cooking. a.  Pt to report at least a 50% reduction in BUE radicular symptoms to improve participation in recreational activities such as sewing and edincheting. 2. Pt to demonstrate improved functional mobility with NDI score of 40% impaired or less. Ongoing   3. Pt to demonstrate improve functional mobility with Oswestry score of 50% impaired or less. 4. Pt to demonstrate ability to stand for at least 20 minutes with improved postural mechanics and without pain to reduce difficulty with cooking and cleaning. 5. Pt to demonstrate ability to ambulate at least 300' with improved gait mechanics and without pain to improve community access.         Patient goals: reduce pain, improve function    Pt. Education:  [x] Yes  [] No  [x] Reviewed Prior HEP/Ed  Method of Education: [x] Verbal  [x] Demo  [x] Written updated HEP and orange t band 5/25/22  Access Code: [email or web address]  URL: [email or web address]  Date: 05/09/2022    Exercises  Seated Cervical Sidebending AROM - 1 x daily - 5 x weekly - 1 sets - 10 reps - 5-10\" hold  Seated Cervical Rotation AROM - 1 x daily - 5 x weekly - 1 sets - 10 reps - 5-10\" hold  Seated Scapular Retraction - 1 x daily - 5 x weekly - 2 sets - 10 reps  Shoulder Rolls in Sitting - 1 x daily - 5 x weekly - 2 sets - 10 reps  Seated Shoulder Flexion - 1 x daily - 5 x weekly - 2 sets - 10 reps  Seated March - 1 x daily - 5 x weekly - 2 sets - 10 reps  Supine Hamstring Stretch with Strap - 1 x daily - 5 x weekly - 3 sets - 20-30 sec hold  Supine Lower Trunk Rotation - 1 x daily - 5 x weekly - 2 sets - 10 reps  Supine Bridge - 1 x daily - 5 x weekly - 2 sets - 10 reps  Supine Active Straight Leg Raise - 1 x daily - 5 x weekly - 2 sets - 10 reps  Supine Transversus Abdominis Bracing - Hands on Stomach - 1 x daily - 5 x weekly - 2 sets - 10 reps - 5 sec hold  Clamshell - 1 x daily - 5 x weekly - 2 sets - 10 reps    Date: 05/11/2022  Prepared by: Rufina Tamayo    Exercises  Sidelying Thoracic Rotation with Open Book - 1 x daily - 7 x weekly - 1 sets - 10 reps  Sidelying Reverse Clamshell - 1 x daily - 7 x weekly - 1 sets - 10 reps  Supine Single Knee to Chest Stretch - 1 x daily - 7 x weekly - 5 sets - 10\" hold    Access Code: [email or web address]  URL: [email or web address]  Date: 05/25/2022    Exercises  Standing Row with Anchored Resistance - 1 x daily - 5 x weekly - 10 reps  Shoulder Extension with Resistance - 1 x daily - 5 x weekly - 10 reps  Shoulder External Rotation and Scapular Retraction with Resistance - 1 x daily - 5 x weekly - 10 reps  Standing Shoulder Horizontal Abduction with Resistance - 1 x daily - 5 x weekly - 10 reps      Comprehension of Education:  [] Verbalizes understanding. [] Demonstrates understanding. [] Needs review. [x] Demonstrates/verbalizes HEP/Ed previously given. Plan: [x] Continue current frequency toward long and short term goals.     [x] Specific Instructions for subsequent treatments: progress as able       Time In: 1:02 pm            Time Out: 1:52 pm     Electronically signed by:  Cayden Salmeron PTA

## 2022-06-06 ENCOUNTER — HOSPITAL ENCOUNTER (OUTPATIENT)
Dept: PHYSICAL THERAPY | Facility: CLINIC | Age: 67
Setting detail: THERAPIES SERIES
Discharge: HOME | End: 2022-06-06
Payer: COMMERCIAL

## 2022-06-06 PROCEDURE — 97110 THERAPEUTIC EXERCISES: CPT

## 2022-06-06 NOTE — FLOWSHEET NOTE
[] [facility]) [facility] &  Therapy  [address]  [phone]  F: [phone] [x] [address]  [facility]   [address]  P: [phone]  F: [phone] [] [address]  [address]  P: [phone]  F: [phone] [] [address]  P: [phone]  F: [phone] [] [address]  [facility]   [address]   [place]: [phone]  F: [phone]      Physical Therapy Daily Treatment Note    Date:  2022  Patient Name:  Eric Tejada    :  1955  MRN: [id number]  Physician: Shanta Travis MD                Insurance: [employer] *12vs approved, 22 - 22   6 remaining vs, 22-22  12 total vs starting 22  Medical Diagnosis:   M47.816 (ICD-10-CM) - Spondylosis without myelopathy or radiculopathy, lumbar region   M47.892 (ICD-10-CM) - Other spondylosis, cervical region   M48.02 (ICD-10-CM) - Spinal stenosis, cervical region   M54.16 (ICD-10-CM) - Radiculopathy, lumbar region   Rehab Codes: M47.816, M47.892, M48.02, M54.16, M62.81, R29.3  Onset Date: referral 22             Next 's appt. : tbd      Visit# / total visits:     Cancels/No Shows: 2/0    Subjective:    Pain:  [x] Yes  [] No Location: neck, back, B shoulders  Pain Rating: (0-10 scale) 0/10 neck and B shoulders; 4-5/10 LBP; R hip 4-5/10  Pain altered Tx:  [x] No  [] Yes  Action:   Comments:  Pt arrives stating she had increased pain in R hip after leaning forward on tub to clean woodrow litter box. Has been taking ms relaxer's to help reduce sx's. Reports compliance to updated HEP.       Objective:  Modalities:  MHP to low back during seated stretches/ex's per preference    Precautions:  Exercises: bold exercises performed 06/06/22  Exercise bilat Reps/ Time Weight/ Level Comments   Nu step  5' L1 Added 6/6         Seated    With HP to LB 6/6   C-AROM 5x5\" ea  Rotation, side bend; reduced reps 6/6   PB roll outs  5x5\"  New 5/11   UT stretch 3x30\"  Increased hold time 6/6   Scapular retraction 10x3\"  New 5/11   Chin tucks  20x3\"  New 5/23   lumbar flexion 10x     lumbar rotation 10x     Side bend stretch  5x10\"     Retro shoulder rolls  10x     Marches  10x  Notes slight irritation in R hip/LB   Scaption  10x A Notes brief irritation in LB 'locking'   Flex, abd  10x ea A    B. ER  10x [place]  New 5/23   B. H abd  10x [place]  New 5/23   R piriformis stretch 3x15\" ea  Figure 4 and across chest; added 6/6         Supine       Diaphragmatic breathing  10x     LTR 10x     HS stretch  3x15\" manual    SKTC 3x10\" A     PPT 10x  Added 5/25   TrA 10x5\"  Hand as tactile cue    Glute squeeze  10x5\"  Legs extended    Single knee fallout TrA 10x     Marches with TrA 10x     SLR's with TrA 10x     Bridges  10x           Side lying    Resumed 6/6   Open books 10x     Clamshells  10x A    Reverse clamshells 10x A          Standing       IR stretch with towel  5x10\"     TA+ row 10x orange    TA + ext 10x orange    Bicep curl 10x [place]  Added 6/6   Tricep press 10x [place]  Added 6/6   paloff press 10xea [place]  Added 6/6   Other:slow progressions to tolerance     Functional Test: NDI; Oswestry Score:  Score: at 4th visit    NDI 26/50 or 52% functionally impaired at initial eval 26/50 or 52% functionally impaired    Oswestry 32/50 or 64% functionally impaired at initial eval 34/50 or 68% impaired          STRENGTH   STRENGTH   ROM     Left Right   Left Right Cervical     C5 Shld Abd 4-* 4* L1-2 Hip Flex 4 4 Flexion 30*   Shld Flexion 4* 4* Hip Abd 4 4- Extension 35*   Shld IR 4- 4- L3-4 Knee Ext 4+ 4+ Rotation L 65 R 60*   Shld ER 4-* 4- L4 Ankle DF 4+ 4+ Sidebend L 25* R 25*   C6 Elb Flex 4 4 L5 EHL     Retraction     C7 Elb Ext 4 4 S1 Plant.  Flex 4+ 4+ Lumbar     C8 EPL     Abdominals poor poor Flexion WFL *   T1 Fing Abd     Erector Spinae poor poor Extension WFL    60# 66#       Rotation L  Limtied 15% * R Limited 15% *         Hip ext  4- 4- Sidebend L Limited 25% * R Limited 25% *               UE/LE L R               Shoulder flexion 180 180               Shoulder abduction 180 180               Shoulder IR  L3* L3*               Shoulder ER                                   Specific Instructions for next treatment: restore cervical and lumbar mobility, global strengthening (core, UE, LE, scapular), reinforce postural education, MHP prn, monitor for centralization of symptoms     Treatment Charges: Mins Units   [x]  Modalities- MHP -- --   [x]  Ther Exercise 50 3   []  Manual Therapy     []  Ther Activities     []  Aquatics     []  Vasocompression     []  Other     Total Treatment time 50 3         Assessment: [x] Progressing toward goals. Implemented warm up on nu step this date to promote increased blood flow and activity tolerance. MHP to LB during seated activities to promote relaxation of musculature. Addition of R piriformis stretch-2 ways as pt notes most irritation is present in this area. Notes relief with stretch and instructed to complete at home as well to further improve ms extensibility and aid in pain reduction. Pt displays improved posture this date. Resumed hip ER/IR exercises in side lying with cues to ensure a pain free ROM. Added resisted bicep curls and tricep press to progress B UE strength. Paloff press added to improve core stability with pt demonstrating good form throughout. Encouraged to walk more around her house and/or outside to improve tolerance to ambulation, pt ensures understanding. [] No change.      [] Other:   [x] Patient would continue to benefit from skilled physical therapy services in order to: reduce pain, restore mobility, and improve strength globally to ease difficulty and improve independence with all daily activities and improve patient participation in recreational activities for QOL.        STG/LTG  Problems:    [x]? ? Pain: up to 10/10 pain neck, back, BUEs  [x]? ? ROM: reduced cervical ROM globally, lumbar sidebend and rotation, shoulder IR  [x]? ? Strength: impaired bilateral shoulder, hip, and core strength globally  [x]? ? Function: impaired function indicated by NDI score of 52% and oswestry score of 64%   [x]? Other:  Impaired gait, impaired posture      STG: (to be met in 8 treatments) Reassessed by primary PT 5/23/22  1. ? Pain: Pt to reduce neck and back pain levels to 7/10 or less to improve tolerance to therapeutic exercise progressions. Ongoing  a. Pt to report at least a 25% improvement in UE radicular symptoms to ease difficulty with lifting and carrying related tasks. MET  2. ? ROM: Patient to demonstrate improved pain-free, cervical AROM, by at least 5 degrees each direction to ease difficulty with ADLs. Ongoing  a. Pt to demonstrate full, pain-free,  lumbar AROM globally to ease difficulty with all daily activities. Ongoing- limited globally    3. ? Strength: Pt to demonstrate improved bilateral shoulder strength to 4+/5 globally to ease difficulty with heavier lifting and carrying tasks. Ongoing- limited globally  a. Pt to demonstrate improved hip strength to at least 4+/5 globally to reduce difficulty with prolonged standing/walking/stair climbing. Ongoing- limited globally    4. ? Function: Pt to demonstrate ability to sit for at least 30 minutes with improved postural mechanics and without increased neck or back pain. Ongoing- inc LBP  5. Patient to be independent with home exercise program as demonstrated by performance with correct form without cues. MET     LTG: (to be met in 12 treatments)  1. Pt to reduce neck and back pain levels to 4/10 or less to improve independence with household chores including cleaning and cooking. a.  Pt to report at least a 50% reduction in BUE radicular symptoms to improve participation in recreational activities such as sewing and crocheting. 2. Pt to demonstrate improved functional mobility with NDI score of 40% impaired or less. Ongoing   3. Pt to demonstrate improve functional mobility with Oswestry score of 50% impaired or less. 4. Pt to demonstrate ability to stand for at least 20 minutes with improved postural mechanics and without pain to reduce difficulty with cooking and cleaning. 5. Pt to demonstrate ability to ambulate at least 300' with improved gait mechanics and without pain to improve community access.         Patient goals: reduce pain, improve function    Pt. Education:  [x] Yes  [] No  [x] Reviewed Prior HEP/Ed  Method of Education: [x] Verbal form as needed  [x] Demo bicep curl, tricep press, paloff press, piriformis stretch  [] Written   Access Code: [email or web address]  URL: [email or web address]  Date: 05/09/2022    Exercises  Seated Cervical Sidebending AROM - 1 x daily - 5 x weekly - 1 sets - 10 reps - 5-10\" hold  Seated Cervical Rotation AROM - 1 x daily - 5 x weekly - 1 sets - 10 reps - 5-10\" hold  Seated Scapular Retraction - 1 x daily - 5 x weekly - 2 sets - 10 reps  Shoulder Rolls in Sitting - 1 x daily - 5 x weekly - 2 sets - 10 reps  Seated Shoulder Flexion - 1 x daily - 5 x weekly - 2 sets - 10 reps  Seated March - 1 x daily - 5 x weekly - 2 sets - 10 reps  Supine Hamstring Stretch with Strap - 1 x daily - 5 x weekly - 3 sets - 20-30 sec hold  Supine Lower Trunk Rotation - 1 x daily - 5 x weekly - 2 sets - 10 reps  Supine Bridge - 1 x daily - 5 x weekly - 2 sets - 10 reps  Supine Active Straight Leg Raise - 1 x daily - 5 x weekly - 2 sets - 10 reps  Supine Transversus Abdominis Bracing - Hands on Stomach - 1 x daily - 5 x weekly - 2 sets - 10 reps - 5 sec hold  Clamshell - 1 x daily - 5 x weekly - 2 sets - 10 reps    Date: 05/11/2022  Prepared by: Jayla Heller    Exercises  Sidelying Thoracic Rotation with Open Book - 1 x daily - 7 x weekly - 1 sets - 10 reps  Sidelying Reverse Clamshell - 1 x daily - 7 x weekly - 1 sets - 10 reps  Supine Single Knee to Chest Stretch - 1 x daily - 7 x weekly - 5 sets - 10\" hold    Access Code: [email or web address]  URL: [email or web address]  Date: 05/25/2022    Exercises  Standing Row with Anchored Resistance - 1 x daily - 5 x weekly - 10 reps  Shoulder Extension with Resistance - 1 x daily - 5 x weekly - 10 reps  Shoulder External Rotation and Scapular Retraction with Resistance - 1 x daily - 5 x weekly - 10 reps  Standing Shoulder Horizontal Abduction with Resistance - 1 x daily - 5 x weekly - 10 reps      Comprehension of Education:  [] Verbalizes understanding. [] Demonstrates understanding. [] Needs review. [x] Demonstrates/verbalizes HEP/Ed previously given. Plan: [x] Continue current frequency toward long and short term goals.     [x] Specific Instructions for subsequent treatments: progress as able       Time In: 3:00 pm            Time Out: 3:55 pm     Electronically signed by:  Anushka Valladares PTA

## 2022-06-08 ENCOUNTER — HOSPITAL ENCOUNTER (OUTPATIENT)
Dept: PHYSICAL THERAPY | Facility: CLINIC | Age: 67
Setting detail: THERAPIES SERIES
Discharge: HOME | End: 2022-06-08
Payer: COMMERCIAL

## 2022-06-08 PROCEDURE — 97110 THERAPEUTIC EXERCISES: CPT

## 2022-06-08 PROCEDURE — 97140 MANUAL THERAPY 1/> REGIONS: CPT

## 2022-06-08 NOTE — FLOWSHEET NOTE
[] [facility]) [facility] &  Therapy  [address]  [phone]  F: [phone] [x] [address]  [facility]   [address]  P: [phone]  F: [phone] [] [address]  [address]  P: [phone]  F: [phone] [] [address]  P: [phone]  F: [phone] [] [address]  [facility]   [address]   [place]: [phone]  F: [phone]      Physical Therapy Daily Treatment Note    Date:  2022  Patient Name:  Ta Hudson    :  1955  MRN: [id number]  Physician: Mustapha Ceja MD                Insurance: Scaleogylaine Cowasad *12vs approved, 22 - 22   6 remaining vs, 22-22  12 total vs starting 22  Medical Diagnosis:   M47.816 (ICD-10-CM) - Spondylosis without myelopathy or radiculopathy, lumbar region   M47.892 (ICD-10-CM) - Other spondylosis, cervical region   M48.02 (ICD-10-CM) - Spinal stenosis, cervical region   M54.16 (ICD-10-CM) - Radiculopathy, lumbar region   Rehab Codes: M47.816, M47.892, M48.02, M54.16, M62.81, R29.3  Onset Date: referral 22             Next 's appt. : tbd  Visit# / total visits:     Cancels/No Shows: 2/0    Subjective:    Pain:  [x] Yes  [] No Location: neck, back, B shoulders  Pain Rating: (0-10 scale) 2/10 neck and B shoulders; 6/10 low back and hip   Pain altered Tx:  [x] No  [] Yes  Action:   Comments:  Pt arrives noting increased pain through R hip and low back following previous session. Pt does note that she is happy with the progress that her UEs and neck are making since starting therapy. Pt ambulates into clinic with antalgic gait pattern.     Objective:  Modalities:  MHP to low back during seated stretches/ex's per preference Precautions:  Exercises: bold exercises performed 06/08/22  Exercise bilat Reps/ Time Weight/ Level Comments   Nu step  5' L1 Added 6/6         Seated    With HP to LB 6/6   C-AROM 5x5\" ea  Rotation, side bend; reduced reps 6/6   PB roll outs  5x5\"  New 5/11   UT stretch 3x30\"  Increased hold time 6/6   Scapular retraction 10x3\"  New 5/11   Chin tucks  20x3\"  New 5/23   Chin tuck + scapular retraction 10xea  New 6/8   lumbar flexion 10x     lumbar rotation 10x     Side bend stretch  5x10\"     Retro shoulder rolls  10x     Marches  10x  Notes slight irritation in R hip/LB   Scaption  10x A Notes brief irritation in LB 'locking'   Flex, abd  10x ea A    B. ER  15x [place]  New 5/23, inc reps 6/8   B.  H abd  15x [place]  New 5/23, inc reps 6/8   R piriformis stretch 3x20\" ea  Figure 4 and across chest; added 6/6         Supine       Diaphragmatic breathing  10x     LTR 10x     HS stretch  3x20\" strap Inc duration 6/8   SKTC 3x10\" A     PPT 10x  Added 5/25   TrA 10x5\"  Hand as tactile cue    Glute squeeze  10x5\"  Legs extended    Single knee fallout TrA 10x     Marches with TrA 10x     SLR's with TrA 10x     Bridges  10x           Side lying    Resumed 6/6   Open books 10x     Clamshells  10x A    Reverse clamshells 10x A          Standing       IR stretch with towel  5x10\"     TA+ row 10x lime Inc resistance 6/8   TA + ext 10x lime Inc resistance 6/8   Bicep curl 10x lime  Added 6/6, inc resistance 6/8   Tricep press 10x lime Added 6/6, inc resistance 6/8   paloff press 10xea [place]  Added 6/6   Other:slow progressions to tolerance   Manual[de-identified] MFR via hypervolt cushion attachment to R sided lumbar paraspinals, QL, glute, piriformis x 8 minutes     Functional Test: NDI; Oswestry Score:  Score: at 4th visit    NDI 26/50 or 52% functionally impaired at initial eval 26/50 or 52% functionally impaired    Oswestry 32/50 or 64% functionally impaired at initial eval 34/50 or 68% impaired          STRENGTH   STRENGTH   ROM     Left Right   Left Right Cervical     C5 Shld Abd 4-* 4* L1-2 Hip Flex 4 4 Flexion 30*   Shld Flexion 4* 4* Hip Abd 4 4- Extension 35*   Shld IR 4- 4- L3-4 Knee Ext 4+ 4+ Rotation L 65 R 60*   Shld ER 4-* 4- L4 Ankle DF 4+ 4+ Sidebend L 25* R 25*   C6 Elb Flex 4 4 L5 EHL     Retraction     C7 Elb Ext 4 4 S1 Plant. Flex 4+ 4+ Lumbar     C8 EPL     Abdominals poor poor Flexion WFL *   T1 Fing Abd     Erector Spinae poor poor Extension WFL    60# 66#       Rotation L  Limtied 15% * R Limited 15% *         Hip ext  4- 4- Sidebend L Limited 25% * R Limited 25% *               UE/LE L R               Shoulder flexion 180 180               Shoulder abduction 180 180               Shoulder IR  L3* L3*               Shoulder ER                                   Specific Instructions for next treatment: restore cervical and lumbar mobility, global strengthening (core, UE, LE, scapular), reinforce postural education, MHP prn, monitor for centralization of symptoms     Treatment Charges: Mins Units   [x]  Modalities- MHP -- --   [x]  Ther Exercise 48 3   [x]  Manual Therapy 8 1   []  Ther Activities     []  Aquatics     []  Vasocompression     []  Other     Total Treatment time 56 4         Assessment: [x] Progressing toward goals. Initiated session with nustep warm up followed by seated exercises with MHP to low back. Continued with charted exercises with progressions in resistance and repetitions of scapular and UE exercises with good tolerance. Cueing for appropriate breathing mechanics with TA contractions throughout session with good carryover. Pt with slight pain during supine exercises through R sided low back however describes as tolerable. Pt does notice following mat table exercises and manual that she is able to ambulate through clinic without antalgic gait pattern and feels better than when she came in.     [] No change.      [x] Other:  Held sidelying exercises this date as these may have exacerbated symptoms through R hip followed previous session. Implemented manual to R piriformis and lumbar paraspinals to reduce tissue tightness and increased pain this date. Monitor response and implement as needed. [x] Patient would continue to benefit from skilled physical therapy services in order to: reduce pain, restore mobility, and improve strength globally to ease difficulty and improve independence with all daily activities and improve patient participation in recreational activities for QOL.        STG/LTG  Problems:    [x]? ? Pain: up to 10/10 pain neck, back, BUEs  [x]? ? ROM: reduced cervical ROM globally, lumbar sidebend and rotation, shoulder IR  [x]? ? Strength: impaired bilateral shoulder, hip, and core strength globally  [x]? ? Function: impaired function indicated by NDI score of 52% and oswestry score of 64%   [x]? Other:  Impaired gait, impaired posture      STG: (to be met in 8 treatments) Reassessed by primary PT 5/23/22  1. ? Pain: Pt to reduce neck and back pain levels to 7/10 or less to improve tolerance to therapeutic exercise progressions. Ongoing  a. Pt to report at least a 25% improvement in UE radicular symptoms to ease difficulty with lifting and carrying related tasks. MET  2. ? ROM: Patient to demonstrate improved pain-free, cervical AROM, by at least 5 degrees each direction to ease difficulty with ADLs. Ongoing  a. Pt to demonstrate full, pain-free,  lumbar AROM globally to ease difficulty with all daily activities. Ongoing- limited globally    3. ? Strength: Pt to demonstrate improved bilateral shoulder strength to 4+/5 globally to ease difficulty with heavier lifting and carrying tasks. Ongoing- limited globally  a. Pt to demonstrate improved hip strength to at least 4+/5 globally to reduce difficulty with prolonged standing/walking/stair climbing.  Ongoing- limited globally    4. ? Function: Pt to demonstrate ability to sit for at least 30 minutes with improved postural mechanics and without increased neck or back pain. Ongoing- inc LBP  5. Patient to be independent with home exercise program as demonstrated by performance with correct form without cues. MET     LTG: (to be met in 12 treatments)  1. Pt to reduce neck and back pain levels to 4/10 or less to improve independence with household chores including cleaning and cooking. a. Pt to report at least a 50% reduction in BUE radicular symptoms to improve participation in recreational activities such as sewing and crocheting. 2. Pt to demonstrate improved functional mobility with NDI score of 40% impaired or less. Ongoing   3. Pt to demonstrate improve functional mobility with Oswestry score of 50% impaired or less. 4. Pt to demonstrate ability to stand for at least 20 minutes with improved postural mechanics and without pain to reduce difficulty with cooking and cleaning. 5. Pt to demonstrate ability to ambulate at least 300' with improved gait mechanics and without pain to improve community access.         Patient goals: reduce pain, improve function    Pt. Education:  [x] Yes  [] No  [x] Reviewed Prior HEP/Ed  Method of Education: [x] Verbal form as needed, breathing techniques during exercise  [x] Demo   [] Written   Access Code: [facility]  URL: [email or web address]  Date: 05/09/2022    Exercises  Seated Cervical Sidebending AROM - 1 x daily - 5 x weekly - 1 sets - 10 reps - 5-10\" hold  Seated Cervical Rotation AROM - 1 x daily - 5 x weekly - 1 sets - 10 reps - 5-10\" hold  Seated Scapular Retraction - 1 x daily - 5 x weekly - 2 sets - 10 reps  Shoulder Rolls in Sitting - 1 x daily - 5 x weekly - 2 sets - 10 reps  Seated Shoulder Flexion - 1 x daily - 5 x weekly - 2 sets - 10 reps  Seated March - 1 x daily - 5 x weekly - 2 sets - 10 reps  Supine Hamstring Stretch with Strap - 1 x daily - 5 x weekly - 3 sets - 20-30 sec hold  Supine Lower Trunk Rotation - 1 x daily - 5 x weekly - 2 sets - 10 reps  Supine Bridge - 1 x daily - 5 x weekly - 2 sets - 10 reps  Supine Active Straight Leg Raise - 1 x daily - 5 x weekly - 2 sets - 10 reps  Supine Transversus Abdominis Bracing - Hands on Stomach - 1 x daily - 5 x weekly - 2 sets - 10 reps - 5 sec hold  Clamshell - 1 x daily - 5 x weekly - 2 sets - 10 reps    Date: 05/11/2022  Prepared by: Jeremiah Colin    Exercises  Sidelying Thoracic Rotation with Open Book - 1 x daily - 7 x weekly - 1 sets - 10 reps  Sidelying Reverse Clamshell - 1 x daily - 7 x weekly - 1 sets - 10 reps  Supine Single Knee to Chest Stretch - 1 x daily - 7 x weekly - 5 sets - 10\" hold    Access Code: [email or web address]  URL: [email or web address]  Date: 05/25/2022    Exercises  Standing Row with Anchored Resistance - 1 x daily - 5 x weekly - 10 reps  Shoulder Extension with Resistance - 1 x daily - 5 x weekly - 10 reps  Shoulder External Rotation and Scapular Retraction with Resistance - 1 x daily - 5 x weekly - 10 reps  Standing Shoulder Horizontal Abduction with Resistance - 1 x daily - 5 x weekly - 10 reps      Comprehension of Education:  [x] Verbalizes understanding. [x] Demonstrates understanding. [] Needs review. [x] Demonstrates/verbalizes HEP/Ed previously given. Plan: [x] Continue current frequency toward long and short term goals.     [x] Specific Instructions for subsequent treatments: progress as able       Time In: 3:55 pm            Time Out: 4:56 pm     Electronically signed by:  Dl Stone PT

## 2022-06-13 ENCOUNTER — HOSPITAL ENCOUNTER (OUTPATIENT)
Dept: PHYSICAL THERAPY | Facility: CLINIC | Age: 67
Setting detail: THERAPIES SERIES
Discharge: HOME | End: 2022-06-13
Payer: COMMERCIAL

## 2022-06-13 PROCEDURE — 97110 THERAPEUTIC EXERCISES: CPT

## 2022-06-13 NOTE — FLOWSHEET NOTE
[] [facility]) - [facility] &  Therapy  [address]  [phone]  F: [phone] [x] [address]  [employer] [address]   [address]  P: [phone]  F: [phone] [] [address]  [address]  P: [phone]  F: [phone] [] [address]  P: [phone]  F: [phone] [] [address]  [facility]   [address]   [place]: [phone]  F: [phone]      Physical Therapy Daily Treatment Note    Date:  2022  Patient Name:  Jayson Cary    :  1955  MRN: [id number]  Physician: Moises Kebede MD                Insurance: [employer] Distance *12vs approved, 22 - 22   6 remaining vs, 22-22  12 total vs starting 22  Medical Diagnosis:   M47.816 (ICD-10-CM) - Spondylosis without myelopathy or radiculopathy, lumbar region   M47.892 (ICD-10-CM) - Other spondylosis, cervical region   M48.02 (ICD-10-CM) - Spinal stenosis, cervical region   M54.16 (ICD-10-CM) - Radiculopathy, lumbar region   Rehab Codes: M47.816, M47.892, M48.02, M54.16, M62.81, R29.3  Onset Date: referral 22             Next 's appt. : tbd  Visit# / total visits:     Cancels/No Shows: 2/0    Subjective:    Pain:  [x] Yes  [] No Location: neck, back, B shoulders  Pain Rating: (0-10 scale) 3-4/10 neck, back, R hip   Pain altered Tx:  [x] No  [] Yes  Action:   Comments:  Pt arrives with decreased pain this date compared to previous sessions. Pt notes she was able to bend down in her garden to pull weeds and did not have increased LBP upon standing back up. Pt does note that L UE has had slightly more tingling with increased LE stretching and is unsure if this is related.  Pt reports that she saw her chiropractor last week and chiropractor believes her pain may be related to bursitis. Objective:  Modalities:  MHP to low back during seated stretches/ex's per preference  - CP applied 6/13 per pt request during seated exercises   Precautions:  Exercises: bold exercises performed 06/13/22  Exercise bilat Reps/ Time Weight/ Level Comments   Nu step  5' L1 Added 6/6         Seated    With HP to LB 6/6   C-AROM 5x5\" ea  Rotation, side bend; reduced reps 6/6   PB roll outs  5x5\"  New 5/11   UT stretch 3x30\"  Increased hold time 6/6   Scapular retraction 10x3\"  New 5/11   Chin tucks  20x3\"  New 5/23   Chin tuck + scapular retraction 10xea  New 6/8   lumbar flexion 10x     lumbar rotation 10x     Side bend stretch  5x10\"     Retro shoulder rolls  10x     Marches  10x  Notes slight irritation in R hip/LB   Scaption  10x A Notes brief irritation in LB 'locking'   Flex, abd  10x ea A    B. ER  15x [place]  New 5/23, inc reps 6/8   B.  H abd  15x [place]  New 5/23, inc reps 6/8   R piriformis stretch 3x20\" ea  Figure 4 and across chest; added 6/6         Supine       Diaphragmatic breathing  10x     LTR 10x     HS stretch  3x20\" strap Inc duration 6/8   ITB stretch 3x20\" Strap Added 6/13   SKTC 3x10\" A     PPT 10x  Added 5/25   TrA 10x5\"  Hand as tactile cue    Glute squeeze  10x5\"  Legs extended    Single knee fallout TrA 10x orange Added resistance 6/13 Marches with TrA 10x     [place]ec stretch 1'  Added 6/13   SLR's with TrA 10x     Bridges  10x orange Added resistance 6/13- small range          Side lying    Resumed 6/6   Open books 10x     Clamshells  10x A    Reverse clamshells 10x A          Standing       IR stretch with towel  5x10\"     TA+ row 10x lime Inc resistance 6/8   TA + ext 10x lime Inc resistance 6/8   Bicep curl 10x lime  Added 6/6, inc resistance 6/8   Tricep press 10x lime Added 6/6, inc resistance 6/8   paloff press 10xea [place]  Added 6/6   Other:slow progressions to tolerance   Manual[de-identified] MFR via hypervolt cushion attachment to R sided lumbar paraspinals, QL, glute, piriformis x 8 minutes     Functional Test: NDI; Oswestry Score:  Score: at 4th visit    NDI 26/50 or 52% functionally impaired at initial eval 26/50 or 52% functionally impaired    Oswestry 32/50 or 64% functionally impaired at initial eval 34/50 or 68% impaired          STRENGTH   STRENGTH   ROM     Left Right   Left Right Cervical     C5 Shld Abd 4-* 4* L1-2 Hip Flex 4 4 Flexion 30*   Shld Flexion 4* 4* Hip Abd 4 4- Extension 35*   Shld IR 4- 4- L3-4 Knee Ext 4+ 4+ Rotation L 65 R 60*   Shld ER 4-* 4- L4 Ankle DF 4+ 4+ Sidebend L 25* R 25*   C6 Elb Flex 4 4 L5 EHL     Retraction     C7 Elb Ext 4 4 S1 Plant. Flex 4+ 4+ Lumbar     C8 EPL     Abdominals poor poor Flexion WFL *   T1 Fing Abd     Erector Spinae poor poor Extension WFL    60# 66#       Rotation L  Limtied 15% * R Limited 15% *         Hip ext  4- 4- Sidebend L Limited 25% * R Limited 25% *               UE/LE L R               Shoulder flexion 180 180               Shoulder abduction 180 180               Shoulder IR  L3* L3*               Shoulder ER                                   Specific Instructions for next treatment: restore cervical and lumbar mobility, global strengthening (core, UE, LE, scapular), reinforce postural education, MHP prn, monitor for centralization of symptoms     Treatment Charges: Mins Units   [x]  Modalities- CP -- --   [x]  Ther Exercise 55 4   []  Manual Therapy     []  Ther Activities     []  Aquatics     []  Vasocompression     []  Other     Total Treatment time 55 4         Assessment: [x] Progressing toward goals. Initiated session with nustep warm up followed by seated exercises. Pt requests CP this date to lumbar spine so was applied in seated position while performing UE and neck exercises. Added ITB stretching and supine pec stretch to improve mobility and added resistance to bent knee fall out and bridges with good tolerance.  Pt reports slight achiness through R posterior hip however denies overall increased pain. Good tolerance to standing exercises and progressions. Held manual this date as pt reports she has been using theragun at home. Due to time constraints, unable to perform standing exercises this date. Plan to resume standing exercises next session and progress as able. [] No change. [] Other:    [x] Patient would continue to benefit from skilled physical therapy services in order to: reduce pain, restore mobility, and improve strength globally to ease difficulty and improve independence with all daily activities and improve patient participation in recreational activities for QOL.        STG/LTG  Problems:    [x]? ? Pain: up to 10/10 pain neck, back, BUEs  [x]? ? ROM: reduced cervical ROM globally, lumbar sidebend and rotation, shoulder IR  [x]? ? Strength: impaired bilateral shoulder, hip, and core strength globally  [x]? ? Function: impaired function indicated by NDI score of 52% and oswestry score of 64%   [x]? Other:  Impaired gait, impaired posture      STG: (to be met in 8 treatments) Reassessed by primary PT 5/23/22  1. ? Pain: Pt to reduce neck and back pain levels to 7/10 or less to improve tolerance to therapeutic exercise progressions. Ongoing  a. Pt to report at least a 25% improvement in UE radicular symptoms to ease difficulty with lifting and carrying related tasks. MET  2. ? ROM: Patient to demonstrate improved pain-free, cervical AROM, by at least 5 degrees each direction to ease difficulty with ADLs. Ongoing  a. Pt to demonstrate full, pain-free,  lumbar AROM globally to ease difficulty with all daily activities. Ongoing- limited globally    3. ? Strength: Pt to demonstrate improved bilateral shoulder strength to 4+/5 globally to ease difficulty with heavier lifting and carrying tasks. Ongoing- limited globally  a. Pt to demonstrate improved hip strength to at least 4+/5 globally to reduce difficulty with prolonged standing/walking/stair climbing. Ongoing- limited globally    4. ? Function: Pt to demonstrate ability to sit for at least 30 minutes with improved postural mechanics and without increased neck or back pain. Ongoing- inc LBP  5. Patient to be independent with home exercise program as demonstrated by performance with correct form without cues. MET     LTG: (to be met in 12 treatments)  1. Pt to reduce neck and back pain levels to 4/10 or less to improve independence with household chores including cleaning and cooking. a. Pt to report at least a 50% reduction in BUE radicular symptoms to improve participation in recreational activities such as sewing and crocheting. 2. Pt to demonstrate improved functional mobility with NDI score of 40% impaired or less. Ongoing   3. Pt to demonstrate improve functional mobility with Oswestry score of 50% impaired or less. 4. Pt to demonstrate ability to stand for at least 20 minutes with improved postural mechanics and without pain to reduce difficulty with cooking and cleaning. 5. Pt to demonstrate ability to ambulate at least 300' with improved gait mechanics and without pain to improve community access.         Patient goals: reduce pain, improve function    Pt. Education:  [x] Yes  [] No  [x] Reviewed Prior HEP/Ed  Method of Education: [x] Verbal form as needed, breathing techniques during exercise  [x] Demo   [x] Written   Access Code: [facility]  URL: [email or web address]  Date: 05/09/2022    Exercises  Seated Cervical Sidebending AROM - 1 x daily - 5 x weekly - 1 sets - 10 reps - 5-10\" hold  Seated Cervical Rotation AROM - 1 x daily - 5 x weekly - 1 sets - 10 reps - 5-10\" hold  Seated Scapular Retraction - 1 x daily - 5 x weekly - 2 sets - 10 reps  Shoulder Rolls in Sitting - 1 x daily - 5 x weekly - 2 sets - 10 reps  Seated Shoulder Flexion - 1 x daily - 5 x weekly - 2 sets - 10 reps  Seated March - 1 x daily - 5 x weekly - 2 sets - 10 reps  Supine Hamstring Stretch with Strap - 1 x daily - 5 x weekly - 3 sets - 20-30 sec hold  Supine Lower Trunk Rotation - 1 x daily - 5 x weekly - 2 sets - 10 reps  Supine Bridge - 1 x daily - 5 x weekly - 2 sets - 10 reps  Supine Active Straight Leg Raise - 1 x daily - 5 x weekly - 2 sets - 10 reps  Supine Transversus Abdominis Bracing - Hands on Stomach - 1 x daily - 5 x weekly - 2 sets - 10 reps - 5 sec hold  Clamshell - 1 x daily - 5 x weekly - 2 sets - 10 reps    Date: 05/11/2022  Prepared by: Sahil Charles    Exercises  Sidelying Thoracic Rotation with Open Book - 1 x daily - 7 x weekly - 1 sets - 10 reps  Sidelying Reverse Clamshell - 1 x daily - 7 x weekly - 1 sets - 10 reps  Supine Single Knee to Chest Stretch - 1 x daily - 7 x weekly - 5 sets - 10\" hold    Access Code: [email or web address]  URL: [email or web address]  Date: 05/25/2022    Exercises  Standing Row with Anchored Resistance - 1 x daily - 5 x weekly - 10 reps  Shoulder Extension with Resistance - 1 x daily - 5 x weekly - 10 reps  Shoulder External Rotation and Scapular Retraction with Resistance - 1 x daily - 5 x weekly - 10 reps  Standing Shoulder Horizontal Abduction with Resistance - 1 x daily - 5 x weekly - 10 reps    Date: 06/13/2022  Prepared by: Sahil Charles    Exercises  Supine Hamstring Stretch with Strap - 1 x daily - 7 x weekly - 3 sets - 30\" hold  Supine ITB Stretch with Strap - 1 x daily - 7 x weekly - 3 sets - 30\" hold  Supine Thoracic Mobilization Towel Roll Vertical with Arm Stretch - 1 x daily - 7 x weekly - 2 sets - 1' hold  Bridge with Hip Abduction and Resistance - 1 x daily - 7 x weekly - 1 sets - 10 reps - 5\" hold  Hooklying Single Leg Bent Knee Fallouts with Resistance - 1 x daily - 7 x weekly - 1 sets - 10 reps      Comprehension of Education:  [x] Verbalizes understanding. [x] Demonstrates understanding. [x] Needs review. [] Demonstrates/verbalizes HEP/Ed previously given. Plan: [x] Continue current frequency toward long and short term goals.     [x] Specific Instructions for subsequent treatments: progress as able       Time In: 2:00 pm            Time Out: 3:00 pm    Electronically signed by:  Karlee Acosta PT

## 2022-06-15 ENCOUNTER — HOSPITAL ENCOUNTER (OUTPATIENT)
Dept: PHYSICAL THERAPY | Facility: CLINIC | Age: 67
Setting detail: THERAPIES SERIES
Discharge: HOME | End: 2022-06-15
Payer: COMMERCIAL

## 2022-06-15 PROCEDURE — 97110 THERAPEUTIC EXERCISES: CPT

## 2022-06-15 NOTE — FLOWSHEET NOTE
[] [facility]) [facility] &  Therapy  [address]  [phone]  F: [phone] [x] [address]  [facility]   [address]  P: [phone]  F: [phone] [] [address]  [address]  P: [phone]  F: [phone] [] [address]  P: [phone]  F: [phone] [] [address]  [facility]   [address]   [place]: [phone]  F: [phone]      Physical Therapy Daily Treatment Note    Date:  6/15/2022  Patient Name:  Jayson Cary    :  1955  MRN: [id number]  Physician: Moises Kebede MD                Insurance: [employer] *12vs approved, 22 - 22   6 remaining vs, 22-22  12 total vs starting 22  Medical Diagnosis:   M47.816 (ICD-10-CM) - Spondylosis without myelopathy or radiculopathy, lumbar region   M47.892 (ICD-10-CM) - Other spondylosis, cervical region   M48.02 (ICD-10-CM) - Spinal stenosis, cervical region   M54.16 (ICD-10-CM) - Radiculopathy, lumbar region   Rehab Codes: M47.816, M47.892, M48.02, M54.16, M62.81, R29.3  Onset Date: referral 22             Next 's appt. : tbd  Visit# / total visits:     Cancels/No Shows: 2/0    Subjective:    Pain:  [x] Yes  [] No Location: neck, back, B shoulders  Pain Rating: (0-10 scale) 0/10 neck, back, 1-2/10 R hip   Pain altered Tx:  [x] No  [] Yes  Action:   Comments:  Pt arrives noting she feels like a whole new person. Reports she has been feeling much better yesterday and today. Mentions she has been able to do more around the house and outside without issue.       Objective:  Modalities:  MHP to low back during seated stretches/ex's per preference  - CP applied 6/15 per pt request during seated exercises Precautions:  Exercises: bold exercises performed 06/15/22   Exercise bilat Reps/ Time Weight/ Level Comments   Nu step  5' L1 Added 6/6         Seated    With CP to LB 6/15   C-AROM 5x5\" ea  Rotation, side bend; reduced reps 6/6   PB roll outs  5x5\"  New 5/11   UT stretch 2x30\"  Increased hold time 6/6   Scapular retraction 10x3\"  New 5/11   Chin tucks  20x3\"  New 5/23   Chin tuck + scapular retraction 10xea  New 6/8   lumbar flexion 10x     lumbar rotation 10x     Side bend stretch  5x10\"     Retro shoulder rolls  10x     Marches  10x  Notes slight irritation in R hip/LB   Scaption  10x A Notes brief irritation in LB 'locking'   Flex, abd  10x ea A    B. ER  15x [place]  New 5/23, inc reps 6/8   B.  H abd  15x [place]  New 5/23, inc reps 6/8   R piriformis stretch 3x20\" ea  Figure 4 and across chest; added 6/6         Supine       Diaphragmatic breathing  10x     LTR 10x     HS stretch  3x20\"ea strap Inc duration 6/8   ITB stretch 3x20\"ea Strap Added 6/13   SKTC 3x10\" A     PPT 10x  Added 5/25   TrA 10x5\"  Hand as tactile cue    Glute squeeze  10x5\"  Legs extended    Single knee fallout TrA 10x orange Added resistance 6/13 Marches with TrA 10x     Pec stretch 1'  Added 6/13   SLR's with TrA 10x     Bridges  10x orange Added resistance 6/13- small range          Side lying    Resumed 6/6   Open books 10x     Clamshells  10x A    Reverse clamshells 10x A          Standing    Resumed 6/15   IR stretch with towel  5x10\"     TA+ row 10x lime Inc resistance 6/8   TA + ext 10x lime Inc resistance 6/8   B ER 10x Lime  Added 6/15   Bicep curl 10x lime  Added 6/6, inc resistance 6/8   Tricep press 10x lime Added 6/6, inc resistance 6/8   paloff press 10xea Orange  Added 6/6   paloff walk out  5xea Single orange  Added 6/15; some irritation in L knee   Other:slow progressions to tolerance   Manual[de-identified] MFR via hypervolt cushion attachment to R sided lumbar paraspinals, QL, glute, piriformis x 8 minutes     Functional Test: NDI; Oswestry Score:  Score: at 4th visit    NDI 26/50 or 52% functionally impaired at initial eval 26/50 or 52% functionally impaired    Oswestry 32/50 or 64% functionally impaired at initial eval 34/50 or 68% impaired          STRENGTH   STRENGTH   ROM     Left Right   Left Right Cervical     C5 Shld Abd 4-* 4* L1-2 Hip Flex 4 4 Flexion 30*   Shld Flexion 4* 4* Hip Abd 4 4- Extension 35*   Shld IR 4- 4- L3-4 Knee Ext 4+ 4+ Rotation L 65 R 60*   Shld ER 4-* 4- L4 Ankle DF 4+ 4+ Sidebend L 25* R 25*   C6 Elb Flex 4 4 L5 EHL     Retraction     C7 Elb Ext 4 4 S1 Plant. Flex 4+ 4+ Lumbar     C8 EPL     Abdominals poor poor Flexion WFL *   T1 Fing Abd     Erector Spinae poor poor Extension WFL    60# 66#       Rotation L  Limtied 15% * R Limited 15% *         Hip ext  4- 4- Sidebend L Limited 25% * R Limited 25% *               UE/LE L R               Shoulder flexion 180 180               Shoulder abduction 180 180               Shoulder IR  L3* L3*               Shoulder ER                                   Specific Instructions for next treatment: restore cervical and lumbar mobility, global strengthening (core, UE, LE, scapular), reinforce postural education, MHP prn, monitor for centralization of symptoms     Treatment Charges: Mins Units   [x]  Modalities- CP -- --   [x]  Ther Exercise 50 3   []  Manual Therapy     []  Ther Activities     []  Aquatics     []  Vasocompression     []  Other     Total Treatment time 50 3         Assessment: [x] Progressing toward goals. Applied CP to LB during seated exercises per pt request. Continued with stretches and exercises as charted above. Resumed standing exercises this date to challenge strength and core stability. Implemented resisted ER with good tolerance. Vc's to reduce UT compensation as needed with good carryover. Addition of paloff walk outs to further challenge core stability, however, notes some irritation in L knee with completion so limited to 5 reps.  No complaints of increased pain in neck, back, or hip throughout session. Muscle soreness present post tx.     [] No change. [] Other:    [x] Patient would continue to benefit from skilled physical therapy services in order to: reduce pain, restore mobility, and improve strength globally to ease difficulty and improve independence with all daily activities and improve patient participation in recreational activities for QOL.        STG/LTG  Problems:    [x]? ? Pain: up to 10/10 pain neck, back, BUEs  [x]? ? ROM: reduced cervical ROM globally, lumbar sidebend and rotation, shoulder IR  [x]? ? Strength: impaired bilateral shoulder, hip, and core strength globally  [x]? ? Function: impaired function indicated by NDI score of 52% and oswestry score of 64%   [x]? Other:  Impaired gait, impaired posture      STG: (to be met in 8 treatments) Reassessed by primary PT 5/23/22  1. ? Pain: Pt to reduce neck and back pain levels to 7/10 or less to improve tolerance to therapeutic exercise progressions. Ongoing  a. Pt to report at least a 25% improvement in UE radicular symptoms to ease difficulty with lifting and carrying related tasks. MET  2. ? ROM: Patient to demonstrate improved pain-free, cervical AROM, by at least 5 degrees each direction to ease difficulty with ADLs. Ongoing  a. Pt to demonstrate full, pain-free,  lumbar AROM globally to ease difficulty with all daily activities. Ongoing- limited globally    3. ? Strength: Pt to demonstrate improved bilateral shoulder strength to 4+/5 globally to ease difficulty with heavier lifting and carrying tasks. Ongoing- limited globally  a. Pt to demonstrate improved hip strength to at least 4+/5 globally to reduce difficulty with prolonged standing/walking/stair climbing. Ongoing- limited globally    4. ? Function: Pt to demonstrate ability to sit for at least 30 minutes with improved postural mechanics and without increased neck or back pain. Ongoing- inc LBP  5.  Patient to be independent with home exercise program as demonstrated by performance with correct form without cues. MET     LTG: (to be met in 12 treatments)  1. Pt to reduce neck and back pain levels to 4/10 or less to improve independence with household chores including cleaning and cooking. a. Pt to report at least a 50% reduction in BUE radicular symptoms to improve participation in recreational activities such as sewing and crocheting. 2. Pt to demonstrate improved functional mobility with NDI score of 40% impaired or less. Ongoing   3. Pt to demonstrate improve functional mobility with Oswestry score of 50% impaired or less. 4. Pt to demonstrate ability to stand for at least 20 minutes with improved postural mechanics and without pain to reduce difficulty with cooking and cleaning. 5. Pt to demonstrate ability to ambulate at least 300' with improved gait mechanics and without pain to improve community access.         Patient goals: reduce pain, improve function    Pt. Education:  [x] Yes  [] No  [x] Reviewed Prior HEP/Ed  Method of Education: [x] Verbal form as needed  [x] terence Jones walk out  [] Written   Access Code: Harry Fishman: [email or web address]  Date: 05/09/2022    Exercises  Seated Cervical Sidebending AROM - 1 x daily - 5 x weekly - 1 sets - 10 reps - 5-10\" hold  Seated Cervical Rotation AROM - 1 x daily - 5 x weekly - 1 sets - 10 reps - 5-10\" hold  Seated Scapular Retraction - 1 x daily - 5 x weekly - 2 sets - 10 reps  Shoulder Rolls in Sitting - 1 x daily - 5 x weekly - 2 sets - 10 reps  Seated Shoulder Flexion - 1 x daily - 5 x weekly - 2 sets - 10 reps  Seated March - 1 x daily - 5 x weekly - 2 sets - 10 reps  Supine Hamstring Stretch with Strap - 1 x daily - 5 x weekly - 3 sets - 20-30 sec hold  Supine Lower Trunk Rotation - 1 x daily - 5 x weekly - 2 sets - 10 reps  Supine Bridge - 1 x daily - 5 x weekly - 2 sets - 10 reps  Supine Active Straight Leg Raise - 1 x daily - 5 x weekly - 2 sets - 10 reps  Supine Transversus Abdominis Bracing - Hands on Stomach - 1 x daily - 5 x weekly - 2 sets - 10 reps - 5 sec hold  Clamshell - 1 x daily - 5 x weekly - 2 sets - 10 reps    Date: 05/11/2022  Prepared by: Nikita Socks    Exercises  Sidelying Thoracic Rotation with Open Book - 1 x daily - 7 x weekly - 1 sets - 10 reps  Sidelying Reverse Clamshell - 1 x daily - 7 x weekly - 1 sets - 10 reps  Supine Single Knee to Chest Stretch - 1 x daily - 7 x weekly - 5 sets - 10\" hold    Access Code: [email or web address]  URL: [email or web address]  Date: 05/25/2022    Exercises  Standing Row with Anchored Resistance - 1 x daily - 5 x weekly - 10 reps  Shoulder Extension with Resistance - 1 x daily - 5 x weekly - 10 reps  Shoulder External Rotation and Scapular Retraction with Resistance - 1 x daily - 5 x weekly - 10 reps  Standing Shoulder Horizontal Abduction with Resistance - 1 x daily - 5 x weekly - 10 reps    Date: 06/13/2022  Prepared by: Nikita Socks    Exercises  Supine Hamstring Stretch with Strap - 1 x daily - 7 x weekly - 3 sets - 30\" hold  Supine ITB Stretch with Strap - 1 x daily - 7 x weekly - 3 sets - 30\" hold  Supine Thoracic Mobilization Towel Roll Vertical with Arm Stretch - 1 x daily - 7 x weekly - 2 sets - 1' hold  Bridge with Hip Abduction and Resistance - 1 x daily - 7 x weekly - 1 sets - 10 reps - 5\" hold  Hooklying Single Leg Bent Knee Fallouts with Resistance - 1 x daily - 7 x weekly - 1 sets - 10 reps      Comprehension of Education:  [x] Verbalizes understanding. [x] Demonstrates understanding. [x] Needs review. [] Demonstrates/verbalizes HEP/Ed previously given. Plan: [x] Continue current frequency toward long and short term goals.     [x] Specific Instructions for subsequent treatments: progress as able       Time In: 2:00 pm        Time Out: 2:55 pm    Electronically signed by:  Tamie Trinh PTA

## 2022-06-20 ENCOUNTER — HOSPITAL ENCOUNTER (OUTPATIENT)
Dept: PHYSICAL THERAPY | Facility: CLINIC | Age: 67
Setting detail: THERAPIES SERIES
Discharge: HOME | End: 2022-06-20
Payer: COMMERCIAL

## 2022-06-20 PROCEDURE — 97110 THERAPEUTIC EXERCISES: CPT

## 2022-06-20 NOTE — FLOWSHEET NOTE
Objective:  Modalities:  MHP to low back during seated stretches/ex's per preference  - CP applied 6/20 per pt request during seated exercises   Precautions:  Exercises: bold exercises performed 06/20/22   Exercise bilat Reps/ Time Weight/ Level Comments   Nu step  5' L1 Added 6/6         Seated    With CP to LB 6/20   C-AROM 5x5\" ea  Rotation, side bend; reduced reps 6/6   PB roll outs  5x5\"  New 5/11   UT stretch 2x30\"  Increased hold time 6/6   Scapular retraction 10x3\"  New 5/11   Chin tucks  20x3\"  New 5/23   Chin tuck + scapular retraction 10xea  New 6/8   lumbar flexion 10x     lumbar rotation 10x     Side bend stretch  5x10\"     Retro shoulder rolls  10x     Marches  10x  Notes slight irritation in R hip/LB   Scaption  10x A Notes brief irritation in LB 'locking'   Flex, abd  10x ea A    B. ER  15x [place]  New 5/23, inc reps 6/8   B.  H abd  15x [place]  New 5/23, inc reps 6/8   R piriformis stretch 4x30\" ea  Figure 4 and across chest; added 6/6  Inc 6/20         Supine       Diaphragmatic breathing  10x     LTR 10x     HS stretch  3x20\"ea strap Inc duration 6/8   ITB stretch 3x20\"ea Strap Added 6/13   SKTC 3x10\" A     PPT 10x  Added 5/25   TrA 10x5\"  Hand as tactile cue    Glute squeeze  10x5\"  Legs extended    Single knee fallout TrA 10x orange Added resistance 6/13 Marches with TrA 10x     Pec stretch 1'  Added 6/13   TrA + Hip add  10x5\"  Added 6/20   SLR's with TrA 10x     Bridges  10x orange Added resistance 6/13- small range          Side lying    Resumed 6/6   Open books 10x     Clamshells  10x A    Reverse clamshells 10x A          Standing    Resumed 6/15   IR stretch with towel  5x10\"     TA+ row 10x lime Inc resistance 6/8   TA + ext 10x lime Inc resistance 6/8   B ER 10x Lime  Added 6/15   Bicep curl 10x lime  Added 6/6, inc resistance 6/8   Tricep press 10x lime Added 6/6, inc resistance 6/8   paloff press 10xea Orange  Added 6/6   paloff walk out  5xea Single orange  Added 6/15; some irritation in L knee   Other:slow progressions to tolerance   Manual[de-identified] MFR via hypervolt cushion attachment to bilateral UTs  R sided lumbar paraspinals, QL, glute, piriformis x 4 minutes     Functional Test: NDI; Oswestry Score:  Score: at 4th visit    NDI 26/50 or 52% functionally impaired at initial eval 26/50 or 52% functionally impaired    Oswestry 32/50 or 64% functionally impaired at initial eval 34/50 or 68% impaired          STRENGTH   STRENGTH   ROM     Left Right   Left Right Cervical     C5 Shld Abd 4-* 4* L1-2 Hip Flex 4 4 Flexion 30*   Shld Flexion 4* 4* Hip Abd 4 4- Extension 35*   Shld IR 4- 4- L3-4 Knee Ext 4+ 4+ Rotation L 65 R 60*   Shld ER 4-* 4- L4 Ankle DF 4+ 4+ Sidebend L 25* R 25*   C6 Elb Flex 4 4 L5 EHL     Retraction     C7 Elb Ext 4 4 S1 Plant. Flex 4+ 4+ Lumbar     C8 EPL     Abdominals poor poor Flexion WFL *   T1 Fing Abd     Erector Spinae poor poor Extension WFL    60# 66#       Rotation L  Limtied 15% * R Limited 15% *         Hip ext  4- 4- Sidebend L Limited 25% * R Limited 25% *               UE/LE L R               Shoulder flexion 180 180               Shoulder abduction 180 180               Shoulder IR  L3* L3*               Shoulder ER                                   Specific Instructions for next treatment: restore cervical and lumbar mobility, global strengthening (core, UE, LE, scapular), reinforce postural education, MHP prn, monitor for centralization of symptoms     Treatment Charges: Mins Units   [x]  Modalities- CP -- --   [x]  Ther Exercise 53 4   [x]  Manual Therapy 4 --   []  Ther Activities     []  Aquatics     []  Vasocompression     []  Other     Total Treatment time 57 4         Assessment: [] Progressing toward goals. [] No change.      [x] Other:  Initiated session with nustep warm up followed by seated exercises with CP applied to low back per pt request. Added hypervolt to bilateral UT due to increased tightness and tenderness upon arrival this date with positive relief of symptoms post manual. Continued with previous exercises adding supine hip add with cues for TA engagement without further progressions due to increased soreness. Regressed standing exercise progressions to assess pt tolerance. May implement again at next session if appropriate. Pt without complaint of pain post exercise and does describe feeling \"looser\". [x] Patient would continue to benefit from skilled physical therapy services in order to: reduce pain, restore mobility, and improve strength globally to ease difficulty and improve independence with all daily activities and improve patient participation in recreational activities for QOL.        STG/LTG  Problems:    [x]? ? Pain: up to 10/10 pain neck, back, BUEs  [x]? ? ROM: reduced cervical ROM globally, lumbar sidebend and rotation, shoulder IR  [x]? ? Strength: impaired bilateral shoulder, hip, and core strength globally  [x]? ? Function: impaired function indicated by NDI score of 52% and oswestry score of 64%   [x]? Other:  Impaired gait, impaired posture      STG: (to be met in 8 treatments) Reassessed by primary PT 5/23/22  1. ? Pain: Pt to reduce neck and back pain levels to 7/10 or less to improve tolerance to therapeutic exercise progressions. Ongoing  a. Pt to report at least a 25% improvement in UE radicular symptoms to ease difficulty with lifting and carrying related tasks. MET  2. ? ROM: Patient to demonstrate improved pain-free, cervical AROM, by at least 5 degrees each direction to ease difficulty with ADLs. Ongoing  a. Pt to demonstrate full, pain-free,  lumbar AROM globally to ease difficulty with all daily activities. Ongoing- limited globally    3. ? Strength: Pt to demonstrate improved bilateral shoulder strength to 4+/5 globally to ease difficulty with heavier lifting and carrying tasks. Ongoing- limited globally  a.  Pt to demonstrate improved hip strength to at least 4+/5 globally to reduce difficulty with prolonged standing/walking/stair climbing. Ongoing- limited globally    4. ? Function: Pt to demonstrate ability to sit for at least 30 minutes with improved postural mechanics and without increased neck or back pain. Ongoing- inc LBP  5. Patient to be independent with home exercise program as demonstrated by performance with correct form without cues. MET     LTG: (to be met in 12 treatments)  1. Pt to reduce neck and back pain levels to 4/10 or less to improve independence with household chores including cleaning and cooking. a. Pt to report at least a 50% reduction in BUE radicular symptoms to improve participation in recreational activities such as sewing and crocheting. 2. Pt to demonstrate improved functional mobility with NDI score of 40% impaired or less. Ongoing   3. Pt to demonstrate improve functional mobility with Oswestry score of 50% impaired or less. 4. Pt to demonstrate ability to stand for at least 20 minutes with improved postural mechanics and without pain to reduce difficulty with cooking and cleaning. 5. Pt to demonstrate ability to ambulate at least 300' with improved gait mechanics and without pain to improve community access.         Patient goals: reduce pain, improve function    Pt. Education:  [x] Yes  [] No  [x] Reviewed Prior HEP/Ed  Method of Education: [x] Verbal form as needed  [x] terence Jones walk out  [] Written   Access Code: Lux Norris: [email or web address]  Date: 05/09/2022    Exercises  Seated Cervical Sidebending AROM - 1 x daily - 5 x weekly - 1 sets - 10 reps - 5-10\" hold  Seated Cervical Rotation AROM - 1 x daily - 5 x weekly - 1 sets - 10 reps - 5-10\" hold  Seated Scapular Retraction - 1 x daily - 5 x weekly - 2 sets - 10 reps  Shoulder Rolls in Sitting - 1 x daily - 5 x weekly - 2 sets - 10 reps  Seated Shoulder Flexion - 1 x daily - 5 x weekly - 2 sets - 10 reps  Seated March - 1 x daily - 5 x weekly - 2 sets - 10 reps  Supine Hamstring Stretch with Strap - 1 x daily - 5 x weekly - 3 sets - 20-30 sec hold  Supine Lower Trunk Rotation - 1 x daily - 5 x weekly - 2 sets - 10 reps  Supine Bridge - 1 x daily - 5 x weekly - 2 sets - 10 reps  Supine Active Straight Leg Raise - 1 x daily - 5 x weekly - 2 sets - 10 reps  Supine Transversus Abdominis Bracing - Hands on Stomach - 1 x daily - 5 x weekly - 2 sets - 10 reps - 5 sec hold  Clamshell - 1 x daily - 5 x weekly - 2 sets - 10 reps    Date: 05/11/2022  Prepared by: Aure Saeed    Exercises  Sidelying Thoracic Rotation with Open Book - 1 x daily - 7 x weekly - 1 sets - 10 reps  Sidelying Reverse Clamshell - 1 x daily - 7 x weekly - 1 sets - 10 reps  Supine Single Knee to Chest Stretch - 1 x daily - 7 x weekly - 5 sets - 10\" hold    Access Code: [email or web address]  URL: [email or web address]  Date: 05/25/2022    Exercises  Standing Row with Anchored Resistance - 1 x daily - 5 x weekly - 10 reps  Shoulder Extension with Resistance - 1 x daily - 5 x weekly - 10 reps  Shoulder External Rotation and Scapular Retraction with Resistance - 1 x daily - 5 x weekly - 10 reps  Standing Shoulder Horizontal Abduction with Resistance - 1 x daily - 5 x weekly - 10 reps    Date: 06/13/2022  Prepared by: Aure Saeed    Exercises  Supine Hamstring Stretch with Strap - 1 x daily - 7 x weekly - 3 sets - 30\" hold  Supine ITB Stretch with Strap - 1 x daily - 7 x weekly - 3 sets - 30\" hold  Supine Thoracic Mobilization Towel Roll Vertical with Arm Stretch - 1 x daily - 7 x weekly - 2 sets - 1' hold  Bridge with Hip Abduction and Resistance - 1 x daily - 7 x weekly - 1 sets - 10 reps - 5\" hold  Hooklying Single Leg Bent Knee Fallouts with Resistance - 1 x daily - 7 x weekly - 1 sets - 10 reps      Comprehension of Education:  [] Verbalizes understanding. [] Demonstrates understanding. [] Needs review. [x] Demonstrates/verbalizes HEP/Ed previously given. Plan: [x] Continue current frequency toward long and short term goals. [x] Specific Instructions for subsequent treatments: progress as able       Time In: 3:00 pm       Time Out: 4:02 pm    Electronically signed by:  Savita Prince PT

## 2022-06-22 ENCOUNTER — HOSPITAL ENCOUNTER (OUTPATIENT)
Dept: PHYSICAL THERAPY | Facility: CLINIC | Age: 67
Setting detail: THERAPIES SERIES
Discharge: HOME | End: 2022-06-22
Payer: COMMERCIAL

## 2022-06-22 PROCEDURE — 97110 THERAPEUTIC EXERCISES: CPT

## 2022-06-22 PROCEDURE — 97140 MANUAL THERAPY 1/> REGIONS: CPT

## 2022-06-22 NOTE — FLOWSHEET NOTE
[] [facility]) - [facility] &  Therapy  [address]  [phone]  F: [phone] [x] [address]  [facility]   [address]  P: [phone]  F: [phone] [] [address]  [address]  P: [phone]  F: [phone] [] [address]  P: [phone]  F: [phone] [] [address]  [facility]   [address]   [place]: [phone]  F: [phone]      Physical Therapy Daily Treatment Note    Date:  2022  Patient Name:  Anton Parsons    :  1955  MRN: [id number]  Physician: Ray Gonzalez MD                [facility] *12vs approved, 22 - 22   6 remaining vs, 22-22  12 total vs starting 22  Medical Diagnosis:   M47.816 (ICD-10-CM) - Spondylosis without myelopathy or radiculopathy, lumbar region   M47.892 (ICD-10-CM) - Other spondylosis, cervical region   M48.02 (ICD-10-CM) - Spinal stenosis, cervical region   M54.16 (ICD-10-CM) - Radiculopathy, lumbar region   Rehab Codes: M47.816, M47.892, M48.02, M54.16, M62.81, R29.3  Onset Date: referral 22             Next 's appt. : tbd  Visit# / total visits:     Cancels/No Shows: 2/0    Subjective:    Pain:  [x] Yes  [] No Location: neck, back, B shoulders  Pain Rating: (0-10 scale) 5/10   Pain altered Tx:  [] No  [x] Yes  Action: limited exercises   Comments:  Pt arrives with reduced pain compared to last session. Notes night time is the worse for her as pain increases and she has trouble sleeping. Reports she used her massage gun to UT's prior to PT this date. Notes she was aching a little bit after last session.      Objective:  Modalities:  MHP to low back during seated stretches/ex's per preference  - CP applied  per pt request during seated exercises - not today  Precautions:  Exercises: bold exercises performed 06/22/22   Exercise bilat Reps/ Time Weight/ Level Comments   Nu step  5' L1 Added 6/6         Seated    With CP to LB 6/20   C-AROM 5x5\" ea  Rotation, side bend; reduced reps 6/6   PB roll outs  5x5\"  New 5/11   UT stretch 2x30\"  Increased hold time 6/6   Scapular retraction 10x3\"  New 5/11   Chin tucks  20x3\"  New 5/23   Chin tuck + scapular retraction 10xea  New 6/8   lumbar flexion 10x     lumbar rotation 10x     Side bend stretch  5x10\"     Retro shoulder rolls  10x     Marches  10x  Notes slight irritation in R hip/LB   Scaption  10x A Notes brief irritation in LB 'locking'   Flex, abd  10x ea A To 90 degrees   B. ER  15x [place]  New 5/23, inc reps 6/8   B.  H abd  15x [place]  New 5/23, inc reps 6/8   R piriformis stretch 4x30\" ea  Figure 4 and across chest; added 6/6  Inc 6/20         Supine       Diaphragmatic breathing  10x     LTR 10x     HS stretch  3x20\"ea strap Inc duration 6/8   ITB stretch 3x20\"ea Strap Added 6/13   SKTC 3x10\" A     PPT 10x  Added 5/25   TrA 10x5\"  Hand as tactile cue    Glute squeeze  10x5\"  Legs extended    Single knee fallout TrA 10x orange Added resistance 6/13 Marches with TrA 10x     Pec stretch 1'  Added 6/13   TrA + Hip add  10x5\"  Added 6/20   SLR's with TrA 10x     Bridges  10x orange Added resistance 6/13- small range          Side lying    Resumed 6/6   Open books 10x     Clamshells  10x A    Reverse clamshells 10x A          Standing    Resumed 6/15   IR stretch with towel  5x10\"     TA+ row 10x lime Inc resistance 6/8   TA + ext 10x lime Inc resistance 6/8   B ER 10x [place]  Added 6/15   Bicep curl 10x lime  Added 6/6, inc resistance 6/8   Tricep press 10x lime Added 6/6, inc resistance 6/8   paloff press 10xea Orange  Added 6/6   paloff walk out  5xea Single orange  Added 6/15; some irritation in L knee   Other:slow progressions to tolerance   Manual: MFR via hypervolt notes feeling better with reduced pain post tx. Plan to be reassessed by primary PT next session. [] No change. [] Other:    [x] Patient would continue to benefit from skilled physical therapy services in order to: reduce pain, restore mobility, and improve strength globally to ease difficulty and improve independence with all daily activities and improve patient participation in recreational activities for QOL.        STG/LTG  Problems:    [x]? ? Pain: up to 10/10 pain neck, back, BUEs  [x]? ? ROM: reduced cervical ROM globally, lumbar sidebend and rotation, shoulder IR  [x]? ? Strength: impaired bilateral shoulder, hip, and core strength globally  [x]? ? Function: impaired function indicated by NDI score of 52% and oswestry score of 64%   [x]? Other:  Impaired gait, impaired posture      STG: (to be met in 8 treatments) Reassessed by primary PT 5/23/22  1. ? Pain: Pt to reduce neck and back pain levels to 7/10 or less to improve tolerance to therapeutic exercise progressions. Ongoing  a. Pt to report at least a 25% improvement in UE radicular symptoms to ease difficulty with lifting and carrying related tasks. MET  2. ? ROM: Patient to demonstrate improved pain-free, cervical AROM, by at least 5 degrees each direction to ease difficulty with ADLs. Ongoing  a. Pt to demonstrate full, pain-free,  lumbar AROM globally to ease difficulty with all daily activities. Ongoing- limited globally    3. ? Strength: Pt to demonstrate improved bilateral shoulder strength to 4+/5 globally to ease difficulty with heavier lifting and carrying tasks. Ongoing- limited globally  a. Pt to demonstrate improved hip strength to at least 4+/5 globally to reduce difficulty with prolonged standing/walking/stair climbing. Ongoing- limited globally    4. ? Function: Pt to demonstrate ability to sit for at least 30 minutes with improved postural mechanics and without increased neck or back pain. Ongoing- inc LBP  5.  Patient to be independent with home exercise program as demonstrated by performance with correct form without cues. MET     LTG: (to be met in 12 treatments)  1. Pt to reduce neck and back pain levels to 4/10 or less to improve independence with household chores including cleaning and cooking. a. Pt to report at least a 50% reduction in BUE radicular symptoms to improve participation in recreational activities such as sewing and crocheting. 2. Pt to demonstrate improved functional mobility with NDI score of 40% impaired or less. Ongoing   3. Pt to demonstrate improve functional mobility with Oswestry score of 50% impaired or less. 4. Pt to demonstrate ability to stand for at least 20 minutes with improved postural mechanics and without pain to reduce difficulty with cooking and cleaning. 5. Pt to demonstrate ability to ambulate at least 300' with improved gait mechanics and without pain to improve community access.         Patient goals: reduce pain, improve function    Pt. Education:  [x] Yes  [] No  [x] Reviewed Prior HEP/Ed  Method of Education: [x] Verbal reduce UT compensation, HEP   [] Demo  [] Written   Access Code: [email or web address]   URL: [employer]/  Date: 05/09/2022    Exercises  Seated Cervical Sidebending AROM - 1 x daily - 5 x weekly - 1 sets - 10 reps - 5-10\" hold  Seated Cervical Rotation AROM - 1 x daily - 5 x weekly - 1 sets - 10 reps - 5-10\" hold  Seated Scapular Retraction - 1 x daily - 5 x weekly - 2 sets - 10 reps  Shoulder Rolls in Sitting - 1 x daily - 5 x weekly - 2 sets - 10 reps  Seated Shoulder Flexion - 1 x daily - 5 x weekly - 2 sets - 10 reps  Seated March - 1 x daily - 5 x weekly - 2 sets - 10 reps  Supine Hamstring Stretch with Strap - 1 x daily - 5 x weekly - 3 sets - 20-30 sec hold  Supine Lower Trunk Rotation - 1 x daily - 5 x weekly - 2 sets - 10 reps  Supine Bridge - 1 x daily - 5 x weekly - 2 sets - 10 reps  Supine Active Straight Leg Raise - 1 x daily - 5 x weekly - 2 sets - 10 reps  Supine Transversus Abdominis Bracing - Hands on Stomach - 1 x daily - 5 x weekly - 2 sets - 10 reps - 5 sec hold  Clamshell - 1 x daily - 5 x weekly - 2 sets - 10 reps    Date: 05/11/2022  Prepared by: Meng Markch    Exercises  Sidelying Thoracic Rotation with Open Book - 1 x daily - 7 x weekly - 1 sets - 10 reps  Sidelying Reverse Clamshell - 1 x daily - 7 x weekly - 1 sets - 10 reps  Supine Single Knee to Chest Stretch - 1 x daily - 7 x weekly - 5 sets - 10\" hold    Access Code: [email or web address]  URL: [employer]/  Date: 05/25/2022    Exercises  Standing Row with Anchored Resistance - 1 x daily - 5 x weekly - 10 reps  Shoulder Extension with Resistance - 1 x daily - 5 x weekly - 10 reps  Shoulder External Rotation and Scapular Retraction with Resistance - 1 x daily - 5 x weekly - 10 reps  Standing Shoulder Horizontal Abduction with Resistance - 1 x daily - 5 x weekly - 10 reps    Date: 06/13/2022  Prepared by: Meng Markch    Exercises  Supine Hamstring Stretch with Strap - 1 x daily - 7 x weekly - 3 sets - 30\" hold  Supine ITB Stretch with Strap - 1 x daily - 7 x weekly - 3 sets - 30\" hold  Supine Thoracic Mobilization Towel Roll Vertical with Arm Stretch - 1 x daily - 7 x weekly - 2 sets - 1' hold  Bridge with Hip Abduction and Resistance - 1 x daily - 7 x weekly - 1 sets - 10 reps - 5\" hold  Hooklying Single Leg Bent Knee Fallouts with Resistance - 1 x daily - 7 x weekly - 1 sets - 10 reps      Comprehension of Education:  [] Verbalizes understanding. [] Demonstrates understanding. [] Needs review. [x] Demonstrates/verbalizes HEP/Ed previously given. Plan: [x] Continue current frequency toward long and short term goals.     [x] Specific Instructions for subsequent treatments: progress as able       Time In: 3:50 pm      Time Out: 4:55 pm    Electronically signed by:  Josephine Marquez PTA

## 2022-06-27 ENCOUNTER — HOSPITAL ENCOUNTER (OUTPATIENT)
Dept: PHYSICAL THERAPY | Facility: CLINIC | Age: 67
Setting detail: THERAPIES SERIES
Discharge: HOME | End: 2022-06-27
Payer: COMMERCIAL

## 2022-06-27 PROCEDURE — 97110 THERAPEUTIC EXERCISES: CPT

## 2022-06-27 NOTE — DISCHARGE SUMMARY
[] [facility]) - [facility] &  Therapy  [address]  [phone]  F: [phone] [x] [address]  [facility] [address]   [address]  P: [phone]  F: [phone] [] [address]  [address]  P: [phone]  F: [phone] [] [address]  P: [phone]  F: [phone] [] [address]  [facility]   [address]   [place]: [phone]  F: [phone]      Physical Therapy Discharge Note    Date: 2022      Patient: Justyna Quintana  : 1955  MRN: [id number]    Physician: Kennedy Fuller MD                Insurance: [employer] *12vs approved, 22 - 22   6 remaining vs, 22-22  12 total vs starting 22  Medical Diagnosis:   M47.816 (ICD-10-CM) - Spondylosis without myelopathy or radiculopathy, lumbar region   M47.892 (ICD-10-CM) - Other spondylosis, cervical region   M48.02 (ICD-10-CM) - Spinal stenosis, cervical region   M54.16 (ICD-10-CM) - Radiculopathy, lumbar region   Rehab Codes: M47.816, M47.892, M48.02, M54.16, M62.81, R29.3  Onset Date: referral 22             Next 's appt.: 22  Visit# / total visits:                   Cancels/No Shows: 2/0  Date of initial visit: 22               Date of final visit: 22        Subjective:    Pain:  [x]? Yes  []? No   Location: neck, back, B shoulders      Pain Rating: (0-10 scale) 4/10   Pain altered Tx:  [x]? No  []? Yes  Action:   Comments:  Pt arrives with slight soreness at 4/10 between shoulder blades. Pt notes that she is feeling better overall and does feel more flexible. Pt notes that she had difficulty over the weekend due to cramping in her UTs and calves intermittently and is unsure of the cause. Objective:  Test Measurements:      STRENGTH   STRENGTH   ROM     Left Right   Left Right Cervical     C5 Shld Abd 4* 4* L1-2 Hip Flex 4+ 4+ Flexion 35   Shld Flexion 4+ 4+ Hip Abd 4 4 Extension 45   Shld IR 4 4+ L3-4 Knee Ext 4+ 4+ Rotation L 65 R 68*   Shld ER 4* 4+ L4 Ankle DF 4+ 4+ Sidebend L 30* R 35 *   C6 Elb Flex 4+ 4+ L5 EHL     Retraction     C7 Elb Ext 4+ 4+ S1 Plant. Flex 4+ 4+ Lumbar     C8 EPL     Abdominals fair fair Flexion WFL *   T1 Fing Abd     Erector Spinae fair fair Extension WFL    60# 66#       Rotation L  Limtied 15%  R Limited 15%          Hip ext  4 4 Sidebend L Limited 15% R WFL               UE/LE L R               Shoulder flexion 180 180               Shoulder abduction 180 180               Shoulder IR  L3* L3*               Shoulder ER                                      Function:  Functional Test: NDI; Oswestry Score:  Score: at 4th visit  Score at 12th visit             NDI 26/50 or 52% functionally impaired at initial eval 26/50 or 52% functionally impaired  20/50 or 40% functionally impaired    Oswestry 32/50 or 64% functionally impaired at initial eval 34/50 or 68% impaired  28/50 or 56% impaired          Assessment:  [x]? Progressing toward goals. Reassessed progress towards goals this date. Overall pt is demonstrating significant progress towards all goals this date with improvements noted in reduction of neck and UE pain and low back pain, improved mobility, improved UE and LE strength, and improved gait and function globally. Pt is most limited by intermittent increases in pain up to 4/10 at most at night and tightness through bilateral UTs despite improvement from initial evaluation as well as increased pain with static standing. Pt to be discharged and continue with HEP independently. Good prognosis for continued improvement in function and QOL with continued HEP independently. []? No change. []? Other:    [x]? Patient would continue to benefit from skilled physical therapy services in order to: reduce pain, restore mobility, and improve strength globally to ease difficulty and improve independence with all daily activities and improve patient participation in recreational activities for QOL.          STG/LTG  Problems:    [x]? ? ? Pain: up to 10/10 pain neck, back, BUEs  [x]? ? ? ROM: reduced cervical ROM globally, lumbar sidebend and rotation, shoulder IR  [x]? ? ? Strength: impaired bilateral shoulder, hip, and core strength globally  [x]? ? ? Function: impaired function indicated by NDI score of 52% and oswestry score of 64%   [x]? ? Other:  Impaired gait, impaired posture      STG: (to be met in 8 treatments) Reassessed by primary PT 6/27/22  1. ? Pain: Pt to reduce neck and back pain levels to 7/10 or less to improve tolerance to therapeutic exercise progressions. MET- 4/10 at most   a. Pt to report at least a 25% improvement in UE radicular symptoms to ease difficulty with lifting and carrying related tasks.  MET  2. ? ROM: Patient to demonstrate improved pain-free, cervical AROM, by at least 5 degrees each direction to ease difficulty with ADLs. Partially met- limited sidebending   a. Pt to demonstrate full, pain-free,  lumbar AROM globally to ease difficulty with all daily activities. Ongoing- limited globally    3. ? Strength: Pt to demonstrate improved bilateral shoulder strength to 4+/5 globally to ease difficulty with heavier lifting and carrying tasks. Ongoing- limited globally  a. Pt to demonstrate improved hip strength to at least 4+/5 globally to reduce difficulty with prolonged standing/walking/stair climbing. Ongoing- limited ext and abd   4. ? Function: Pt to demonstrate ability to sit for at least 30 minutes with improved postural mechanics and without increased neck or back pain.  MET  5. Patient to be independent with home exercise program as demonstrated by performance with correct form without cues. MET     LTG: (to be met in 12 treatments) Reassessed by primary PT 6/27/22  1. Pt to reduce neck and back pain levels to 4/10 or less to improve independence with household chores including cleaning and cooking.  MET- 4/10 at most   a. Pt to report at least a 50% reduction in BUE radicular symptoms to improve participation in recreational activities such as sewing and crocheting. MET- occurs primarily at night   2. Pt to demonstrate improved functional mobility with NDI score of 40% impaired or less. MET- 40% this date   3. Pt to demonstrate improve functional mobility with Oswestry score of 50% impaired or less. Progress made- 56% impaired this date   4. Pt to demonstrate ability to stand for at least 20 minutes with improved postural mechanics and without pain to reduce difficulty with cooking and cleaning. Ongoing- static standing up to 10 minutes, improves if actively moving in standing   5. Pt to demonstrate ability to ambulate at least 300' with improved gait mechanics and without pain to improve community access.  MET         Patient goals: reduce pain, improve function      Treatment to Date:  [x] Therapeutic Exercise    [] Modalities:  [] Therapeutic Activity    [] Ultrasound  [] Electrical Stimulation  [] Gait Training     [] Massage       [] Lumbar/Cervical Traction  [] Neuromuscular Re-education [x] Cold/hotpack [] Iontophoresis: 4 mg/mL  [x] Instruction in Home Exercise Program                     Dexamethasone Sodium  [x] Manual Therapy             Phosphate 40-80 mAmin  [] Aquatic Therapy                   [] Vasocompression/    [] Other:             Game Ready    Discharge Status:     [] Pt recovered from conditions. Treatment goals were met. [] Pt received maximum benefit. No further therapy indicated at this time. [x] Pt to continue exercise/home instructions independently. [] Therapy interrupted due to:    [] Pt has 2 or more no shows/cancels, is discontinued per our policy.     [x] Pt has completed prescribed number of treatment sessions. [] Other:         Electronically signed by Savita Prince PT on 6/27/2022 at 4:54 PM      If you have any questions or concerns, please don't hesitate to call.   Thank you for your referral.

## 2022-06-27 NOTE — FLOWSHEET NOTE
[] [facility] [address]  [address]  [phone]  F: [phone] [x] [address]  [place] [address]   [address]  P: [phone]  F: [phone] [] [address]  [address] [facility] [address]  P: [phone]  F: [phone] [] [address]  P: [phone]  F: [phone] [] [address]  [facility]   [address]   [place]: [phone]  F: [phone]      Physical Therapy Daily Treatment Note    Date:  2022  Patient Name:  Herb March    :  1955  MRN: [id number]  Physician: Mendez Valdez MD                Insurance: [employer] *12vs approved, 22 - 22   6 remaining vs, 22-22  12 total vs starting 22  Medical Diagnosis:   M47.816 (ICD-10-CM) - Spondylosis without myelopathy or radiculopathy, lumbar region   M47.892 (ICD-10-CM) - Other spondylosis, cervical region   M48.02 (ICD-10-CM) - Spinal stenosis, cervical region   M54.16 (ICD-10-CM) - Radiculopathy, lumbar region   Rehab Codes: M47.816, M47.892, M48.02, M54.16, M62.81, R29.3  Onset Date: referral 22             Next 's appt.: 22  Visit# / total visits:     Cancels/No Shows: 2/0    Subjective:    Pain:  [x] Yes  [] No Location: neck, back, B shoulders  Pain Rating: (0-10 scale) 4/10   Pain altered Tx:  [x] No  [] Yes  Action:   Comments:  Pt arrives with slight soreness at 4/10 between shoulder blades. Pt notes that she is feeling better overall and does feel more flexible. Pt notes that she had difficulty over the weekend due to cramping in her UTs and calves intermittently and is unsure of the cause.         Objective:  Modalities:  MHP to low back during seated stretches/ex's per preference  - CP applied  per pt request during seated exercises - not today  Precautions:  Exercises: bold exercises performed 06/27/22   Exercise bilat Reps/ Time Weight/ Level Comments   Nu step  5' L1 Added 6/6         Seated    With CP to LB 6/20   C-AROM 5x5\" ea  Rotation, side bend; reduced reps 6/6   PB roll outs  5x5\"  New 5/11   UT stretch 2x30\"  Increased hold time 6/6   Scapular retraction 10x3\"  New 5/11   Chin tucks  20x3\"  New 5/23   Chin tuck + scapular retraction 10xea  New 6/8   lumbar flexion 10x     lumbar rotation 10x     Side bend stretch  5x10\"     Retro shoulder rolls  10x     Marches  10x  Notes slight irritation in R hip/LB   Scaption  10x A Notes brief irritation in LB 'locking'   Flex, abd  10x ea A To 90 degrees   B. ER  15x [place]  New 5/23, inc reps 6/8   B.  H abd  15x [place]  New 5/23, inc reps 6/8   R piriformis stretch 4x30\" ea  Figure 4 and across chest; added 6/6  Inc 6/20         Supine       Diaphragmatic breathing  10x     LTR 10x     HS stretch  3x20\"ea strap Inc duration 6/8   ITB stretch 3x20\"ea Strap Added 6/13   SKTC 3x10\" A     PPT 10x  Added 5/25   TrA 10x5\"  Hand as tactile cue    Glute squeeze  10x5\"  Legs extended    Single knee fallout TrA 10x orange Added resistance 6/13 Marches with TrA 10x     Pec stretch 1'  Added 6/13   TrA + Hip add  10x5\"  Added 6/20   SLR's with TrA 10x     Bridges  10x orange Added resistance 6/13- small range          Side lying    Resumed 6/6   Open books 10x     Clamshells  10x A    Reverse clamshells 10x A          Standing    Resumed 6/15   IR stretch with towel  5x10\"     TA+ row 10x lime Inc resistance 6/8   TA + ext 10x lime Inc resistance 6/8   B ER 10x Lime  Added 6/15   Bicep curl 10x lime  Added 6/6, inc resistance 6/8   Tricep press 10x lime Added 6/6, inc resistance 6/8   paloff press 10xea Orange  Added 6/6   paloff walk out  5xea Single orange  Added 6/15; some irritation in L knee   Mini squats  2x5  Added 6/27   3 way hip  10xea A Added 6/27 up to 4/10 at most at night and tightness through bilateral UTs despite improvement from initial evaluation as well as increased pain with static standing. Pt to be discharged and continue with HEP independently. Good prognosis for continued improvement in function and QOL with continued HEP independently. [] No change. [] Other:    [x] Patient would continue to benefit from skilled physical therapy services in order to: reduce pain, restore mobility, and improve strength globally to ease difficulty and improve independence with all daily activities and improve patient participation in recreational activities for QOL.        STG/LTG  Problems:    [x]? ? Pain: up to 10/10 pain neck, back, BUEs  [x]? ? ROM: reduced cervical ROM globally, lumbar sidebend and rotation, shoulder IR  [x]? ? Strength: impaired bilateral shoulder, hip, and core strength globally  [x]? ? Function: impaired function indicated by NDI score of 52% and oswestry score of 64%   [x]? Other:  Impaired gait, impaired posture      STG: (to be met in 8 treatments) Reassessed by primary PT 6/27/22  1. ? Pain: Pt to reduce neck and back pain levels to 7/10 or less to improve tolerance to therapeutic exercise progressions. MET- 4/10 at most   a. Pt to report at least a 25% improvement in UE radicular symptoms to ease difficulty with lifting and carrying related tasks. MET  2. ? ROM: Patient to demonstrate improved pain-free, cervical AROM, by at least 5 degrees each direction to ease difficulty with ADLs. Partially met- limited sidebending   a. Pt to demonstrate full, pain-free,  lumbar AROM globally to ease difficulty with all daily activities. Ongoing- limited globally    3. ? Strength: Pt to demonstrate improved bilateral shoulder strength to 4+/5 globally to ease difficulty with heavier lifting and carrying tasks. Ongoing- limited globally  a.  Pt to demonstrate improved hip strength to at least 4+/5 globally to reduce difficulty with prolonged standing/walking/stair climbing. Ongoing- limited ext and abd   4. ? Function: Pt to demonstrate ability to sit for at least 30 minutes with improved postural mechanics and without increased neck or back pain. MET  5. Patient to be independent with home exercise program as demonstrated by performance with correct form without cues. MET     LTG: (to be met in 12 treatments) Reassessed by primary PT 6/27/22  1. Pt to reduce neck and back pain levels to 4/10 or less to improve independence with household chores including cleaning and cooking. MET- 4/10 at most   a. Pt to report at least a 50% reduction in BUE radicular symptoms to improve participation in recreational activities such as sewing and crocheting. MET- occurs primarily at night   2. Pt to demonstrate improved functional mobility with NDI score of 40% impaired or less. MET- 40% this date   3. Pt to demonstrate improve functional mobility with Oswestry score of 50% impaired or less. Progress made- 56% impaired this date   4. Pt to demonstrate ability to stand for at least 20 minutes with improved postural mechanics and without pain to reduce difficulty with cooking and cleaning. Ongoing- static standing up to 10 minutes, improves if actively moving in standing   5. Pt to demonstrate ability to ambulate at least 300' with improved gait mechanics and without pain to improve community access. MET         Patient goals: reduce pain, improve function    Pt. Education:  [x] Yes  [] No  [x] Reviewed Prior HEP/Ed  Method of Education: [x] Verbal     [x] Demo  [x] Written   Access Code: [email or web address]   URL: [email or web address]  Date: 05/09/2022    Exercises  Seated Cervical Sidebending AROM - 1 x daily - 5 x weekly - 1 sets - 10 reps - 5-10\" hold  Seated Cervical Rotation AROM - 1 x daily - 5 x weekly - 1 sets - 10 reps - 5-10\" hold  Seated Scapular Retraction - 1 x daily - 5 x weekly - 2 sets - 10 reps  Shoulder Rolls in Sitting - 1 x daily - 5 x weekly - 2 sets - 10 reps  Seated Shoulder Flexion - 1 x daily - 5 x weekly - 2 sets - 10 reps  Seated March - 1 x daily - 5 x weekly - 2 sets - 10 reps  Supine Hamstring Stretch with Strap - 1 x daily - 5 x weekly - 3 sets - 20-30 sec hold  Supine Lower Trunk Rotation - 1 x daily - 5 x weekly - 2 sets - 10 reps  Supine Bridge - 1 x daily - 5 x weekly - 2 sets - 10 reps  Supine Active Straight Leg Raise - 1 x daily - 5 x weekly - 2 sets - 10 reps  Supine Transversus Abdominis Bracing - Hands on Stomach - 1 x daily - 5 x weekly - 2 sets - 10 reps - 5 sec hold  Clamshell - 1 x daily - 5 x weekly - 2 sets - 10 reps    Date: 05/11/2022  Prepared by: Anish Norwood    Exercises  Sidelying Thoracic Rotation with Open Book - 1 x daily - 7 x weekly - 1 sets - 10 reps  Sidelying Reverse Clamshell - 1 x daily - 7 x weekly - 1 sets - 10 reps  Supine Single Knee to Chest Stretch - 1 x daily - 7 x weekly - 5 sets - 10\" hold    Access Code: [email or web address]  URL: [email or web address]  Date: 05/25/2022    Exercises  Standing Row with Anchored Resistance - 1 x daily - 5 x weekly - 10 reps  Shoulder Extension with Resistance - 1 x daily - 5 x weekly - 10 reps  Shoulder External Rotation and Scapular Retraction with Resistance - 1 x daily - 5 x weekly - 10 reps  Standing Shoulder Horizontal Abduction with Resistance - 1 x daily - 5 x weekly - 10 reps    Date: 06/13/2022  Prepared by: Anish Norwood    Exercises  Supine Hamstring Stretch with Strap - 1 x daily - 7 x weekly - 3 sets - 30\" hold  Supine ITB Stretch with Strap - 1 x daily - 7 x weekly - 3 sets - 30\" hold  Supine Thoracic Mobilization Towel Roll Vertical with Arm Stretch - 1 x daily - 7 x weekly - 2 sets - 1' hold  Bridge with Hip Abduction and Resistance - 1 x daily - 7 x weekly - 1 sets - 10 reps - 5\" hold  Hooklying Single Leg Bent Knee Fallouts with Resistance - 1 x daily - 7 x weekly - 1 sets - 10 reps    Date: 06/27/2022  Prepared by: Trevon Flannery Lázaro Goff    Exercises  Standing Hip Abduction with Counter Support - 1 x daily - 7 x weekly - 1 sets - 10 reps  Standing Hip Extension with Counter Support - 1 x daily - 7 x weekly - 1 sets - 10 reps  Standing Hip Flexion with Counter Support - 1 x daily - 7 x weekly - 1 sets - 10 reps  Mini Squat with Counter Support - 1 x daily - 7 x weekly - 1 sets - 10 reps    Comprehension of Education:  [x] Verbalizes understanding. [x] Demonstrates understanding. [] Needs review. [] Demonstrates/verbalizes HEP/Ed previously given. Plan: [] Continue current frequency toward long and short term goals.     [x] Specific Instructions for subsequent treatments: pt to be discharged, continue with HEP independently       Time In: 2:02 pm      Time Out: 2:56 pm     Electronically signed by:  Eric Staley PT

## 2022-08-12 DIAGNOSIS — M25.562 PAIN IN BOTH KNEES, UNSPECIFIED CHRONICITY: Primary | ICD-10-CM

## 2022-08-12 DIAGNOSIS — M25.561 PAIN IN BOTH KNEES, UNSPECIFIED CHRONICITY: Primary | ICD-10-CM

## 2022-08-15 ENCOUNTER — OFFICE VISIT (OUTPATIENT)
Dept: ORTHOPEDIC SURGERY | Age: 67
End: 2022-08-15
Payer: COMMERCIAL

## 2022-08-15 VITALS — HEIGHT: 63 IN | WEIGHT: 162 LBS | BODY MASS INDEX: 28.7 KG/M2

## 2022-08-15 DIAGNOSIS — M17.0 PRIMARY OSTEOARTHRITIS OF BOTH KNEES: Primary | ICD-10-CM

## 2022-08-15 PROCEDURE — 1036F TOBACCO NON-USER: CPT | Performed by: ORTHOPAEDIC SURGERY

## 2022-08-15 PROCEDURE — 3017F COLORECTAL CA SCREEN DOC REV: CPT | Performed by: ORTHOPAEDIC SURGERY

## 2022-08-15 PROCEDURE — G8400 PT W/DXA NO RESULTS DOC: HCPCS | Performed by: ORTHOPAEDIC SURGERY

## 2022-08-15 PROCEDURE — 1123F ACP DISCUSS/DSCN MKR DOCD: CPT | Performed by: ORTHOPAEDIC SURGERY

## 2022-08-15 PROCEDURE — 1090F PRES/ABSN URINE INCON ASSESS: CPT | Performed by: ORTHOPAEDIC SURGERY

## 2022-08-15 PROCEDURE — 99203 OFFICE O/P NEW LOW 30 MIN: CPT | Performed by: ORTHOPAEDIC SURGERY

## 2022-08-15 PROCEDURE — G8417 CALC BMI ABV UP PARAM F/U: HCPCS | Performed by: ORTHOPAEDIC SURGERY

## 2022-08-15 PROCEDURE — G8427 DOCREV CUR MEDS BY ELIG CLIN: HCPCS | Performed by: ORTHOPAEDIC SURGERY

## 2022-08-15 NOTE — PROGRESS NOTES
Chief Complaint   Patient presents with    New Patient     BILATERAL KNEE PAIN  - h/o RT KNEE SURGERY 2003

## 2022-08-15 NOTE — PROGRESS NOTES
This patient is seen here complaining of pain in both the knees but right is worse than the left. Patient has a very complicated history. Patient bilateral carpal tunnel surgery and right knee medial meniscectomy carried out here. Patient had been having problems with her neck as well as the back. The patient's pain is now over the medial joint line. She also has a little pain just beyond the medial joint line but did not does not point to patellofemoral compartment over the anterior tibia. Review of femoral joint. There is no pain on the lateral side. There is no history of instability or locking episode. The pain does get worse as the day progresses. It gets worse when she is cleaning or walking. She only takes Tylenol for pain. The patient has had previous gel injection and she has been told by her endocrinologist that she is not to have anymore of the gel injection. She also is to have a cortisone injection and she has been told by her primary care physician she cannot have any more of the cortisone injection. Patient has had fentanyl and Norco by pain management but even that is now taken off. This was done for a number of years ago. Is also back pain and shoulder pain. Did go for physical therapy which did not help her very much. Examination: Both the knees have excellent range of motion. The right 1 has 5 degrees flexion contracture but has full flexion. The left knee has no flexion contracture and full flexion. There is no instability of any of the ligaments. There was no effusion today. There was quadriceps atrophy bilaterally. Clinically there does not appear to be any malalignment. Hip examination showed excellent painless range of motion both sides. X-rays: Both actually knees show that she does have medial and mild patellofemoral compartment osteoarthrosis. Diagnosis: Bilateral primary medial and mild patellofemoral compartment osteoarthrosis.     Treatment: Patient states that her daughter had some kind of gel injection which worked for her. She does not remember what it was. I went over the injections that we have here. She does not recall having received his Synvisc 1. We have put off for authorization to give this injection. In the meantime she is going to find out from her daughter particular  injection she had.

## 2022-08-29 ENCOUNTER — TELEPHONE (OUTPATIENT)
Dept: ORTHOPEDIC SURGERY | Age: 67
End: 2022-08-29

## 2022-08-29 NOTE — TELEPHONE ENCOUNTER
Patient called in requesting we reach out to Dr. Roma Ventura at [facility] for Pain management, requesting specific name injections given to patient for OA KNEE. I sent a letter on a fax cover sheet asking them to send this to us.

## 2022-09-15 ENCOUNTER — TELEPHONE (OUTPATIENT)
Dept: ORTHOPEDIC SURGERY | Age: 67
End: 2022-09-15

## 2022-09-15 NOTE — TELEPHONE ENCOUNTER
Office has not rec'd information. Patient was encouraged to reach out to the provider and see if she can get her medical records.

## 2022-09-15 NOTE — TELEPHONE ENCOUNTER
Patient called in regarding injection request and wanted to know if office had received information from her prior doctor that gave an injection. Informed patient that office had not received information from Dr. Redd Davis office.  Patient was going find out name of previous injection and call back

## 2022-09-15 NOTE — TELEPHONE ENCOUNTER
Pt called in stating she wanting to get an orthovisc injection due to it worked well for her daughter. Pt states her daughter was allergic to the Synvisc injection.  Pt is requesting a call from clinical staff to discuss injections

## 2022-10-11 ENCOUNTER — TELEPHONE (OUTPATIENT)
Dept: ORTHOPEDIC SURGERY | Age: 67
End: 2022-10-11

## 2022-10-11 NOTE — TELEPHONE ENCOUNTER
Patient called to see if medical records were received from Dr. Negro Taylor office. Records not received yet at this time.

## 2022-10-18 ENCOUNTER — HOSPITAL ENCOUNTER (OUTPATIENT)
Age: 67
Discharge: HOME | End: 2022-10-18
Payer: COMMERCIAL

## 2022-10-18 DIAGNOSIS — K90.49 MALABSORPTION DUE TO INTOLERANCE, NOT ELSEWHERE CLASSIFIED: Primary | ICD-10-CM

## 2022-10-18 DIAGNOSIS — K90.49 MALABSORPTION DUE TO INTOLERANCE, NOT ELSEWHERE CLASSIFIED: ICD-10-CM

## 2022-10-18 LAB
ABSOLUTE EOS #: 0.11 K/UL (ref 0–0.44)
ABSOLUTE IMMATURE GRANULOCYTE: <0.03 K/UL (ref 0–0.3)
ABSOLUTE LYMPH #: 1.09 K/UL (ref 1.1–3.7)
ABSOLUTE MONO #: 0.49 K/UL (ref 0.1–1.2)
BASOPHILS # BLD: 1 % (ref 0–2)
BASOPHILS ABSOLUTE: 0.04 K/UL (ref 0–0.2)
EOSINOPHILS RELATIVE PERCENT: 2 % (ref 1–4)
FERRITIN: 15 NG/ML (ref 13–150)
HCT VFR BLD CALC: 41.3 % (ref 36.3–47.1)
HEMOGLOBIN: 12.7 G/DL (ref 11.9–15.1)
IMMATURE GRANULOCYTES: 0 %
IRON SATURATION: 17 % (ref 20–55)
IRON: 51 UG/DL (ref 37–145)
LYMPHOCYTES # BLD: 22 % (ref 24–43)
MCH RBC QN AUTO: 29.1 PG (ref 25.2–33.5)
MCHC RBC AUTO-ENTMCNC: 30.8 G/DL (ref 28.4–34.8)
MCV RBC AUTO: 94.7 FL (ref 82.6–102.9)
MONOCYTES # BLD: 10 % (ref 3–12)
NRBC AUTOMATED: 0 PER 100 WBC
PDW BLD-RTO: 13 % (ref 11.8–14.4)
PLATELET # BLD: 164 K/UL (ref 138–453)
PMV BLD AUTO: 10.9 FL (ref 8.1–13.5)
RBC # BLD: 4.36 M/UL (ref 3.95–5.11)
SEG NEUTROPHILS: 65 % (ref 36–65)
SEGMENTED NEUTROPHILS ABSOLUTE COUNT: 3.21 K/UL (ref 1.5–8.1)
TOTAL IRON BINDING CAPACITY: 298 UG/DL (ref 250–450)
UNSATURATED IRON BINDING CAPACITY: 247 UG/DL (ref 112–347)
WBC # BLD: 5 K/UL (ref 3.5–11.3)

## 2022-10-18 PROCEDURE — 83540 ASSAY OF IRON: CPT

## 2022-10-18 PROCEDURE — 82728 ASSAY OF FERRITIN: CPT

## 2022-10-18 PROCEDURE — 83550 IRON BINDING TEST: CPT

## 2022-10-18 PROCEDURE — 36415 COLL VENOUS BLD VENIPUNCTURE: CPT

## 2022-10-18 PROCEDURE — 85025 COMPLETE CBC W/AUTO DIFF WBC: CPT

## 2022-10-25 ENCOUNTER — OFFICE VISIT (OUTPATIENT)
Dept: ONCOLOGY | Age: 67
End: 2022-10-25
Payer: COMMERCIAL

## 2022-10-25 ENCOUNTER — TELEPHONE (OUTPATIENT)
Dept: ONCOLOGY | Age: 67
End: 2022-10-25

## 2022-10-25 VITALS
DIASTOLIC BLOOD PRESSURE: 61 MMHG | WEIGHT: 163 LBS | HEART RATE: 98 BPM | SYSTOLIC BLOOD PRESSURE: 124 MMHG | BODY MASS INDEX: 28.87 KG/M2

## 2022-10-25 DIAGNOSIS — K90.49 MALABSORPTION DUE TO INTOLERANCE, NOT ELSEWHERE CLASSIFIED: Primary | ICD-10-CM

## 2022-10-25 PROCEDURE — 1090F PRES/ABSN URINE INCON ASSESS: CPT | Performed by: INTERNAL MEDICINE

## 2022-10-25 PROCEDURE — 1036F TOBACCO NON-USER: CPT | Performed by: INTERNAL MEDICINE

## 2022-10-25 PROCEDURE — G8484 FLU IMMUNIZE NO ADMIN: HCPCS | Performed by: INTERNAL MEDICINE

## 2022-10-25 PROCEDURE — 3017F COLORECTAL CA SCREEN DOC REV: CPT | Performed by: INTERNAL MEDICINE

## 2022-10-25 PROCEDURE — 99211 OFF/OP EST MAY X REQ PHY/QHP: CPT | Performed by: INTERNAL MEDICINE

## 2022-10-25 PROCEDURE — G8427 DOCREV CUR MEDS BY ELIG CLIN: HCPCS | Performed by: INTERNAL MEDICINE

## 2022-10-25 PROCEDURE — 99214 OFFICE O/P EST MOD 30 MIN: CPT | Performed by: INTERNAL MEDICINE

## 2022-10-25 PROCEDURE — G8400 PT W/DXA NO RESULTS DOC: HCPCS | Performed by: INTERNAL MEDICINE

## 2022-10-25 PROCEDURE — 1123F ACP DISCUSS/DSCN MKR DOCD: CPT | Performed by: INTERNAL MEDICINE

## 2022-10-25 PROCEDURE — G8417 CALC BMI ABV UP PARAM F/U: HCPCS | Performed by: INTERNAL MEDICINE

## 2022-10-25 RX ORDER — LANOLIN ALCOHOL/MO/W.PET/CERES
50 CREAM (GRAM) TOPICAL DAILY
COMMUNITY

## 2022-10-25 NOTE — TELEPHONE ENCOUNTER
Mariza Reid MD VISIT  RV 12 months with labs before RV  LABS CDP FE TIBC FERRITIN  10/12/23  MD VISIT 10/19/23 @ 1:15PM  AVS PRINTED W/ INSTRUCTIONS AND GIVEN TO PT ON EXIT

## 2022-10-25 NOTE — PROGRESS NOTES
_           Chief Complaint   Patient presents with    Follow-up    Discuss Labs     DIAGNOSIS:       Severe microcytic anemia  Severe iron deficiency  Intolerable side effects to oral iron  Gastroparesis  Constipation  Hemorrhoids  Anal fissure      CURRENT THERAPY:         IV iron infusion February 2018    BRIEF CASE HISTORY:      Ms. Krzysztof Wade is a very pleasant 69 y.o. female with history of multiple comorbidities as listed. She had history of GERD and history of gastroparesis. In addition she had chronic constipation and problems with hemorrhoids bleeding anal fissure. She had GI workup about 2 years ago and was negative. Patient continued to have anemia. She was started on oral iron but she could not tolerate it. She had gastric upset and she had constipation. Patient complains of increasing weakness and fatigue. She feels tired. He has shortness of breath on exertion. No palpitation. She has history of hemorrhoids with occasional bleeding as stated above but no other source of bleeding. No melena or hematochezia. No hematemesis. No vaginal bleeding. No weight loss or decreased appetite. No other complaints. INTERIM HISTORY:   The patient seen for follow-up iron deficiency anemia. She had IV iron infusion in February 2018. She did very well. Hemoglobin is normal.    She continued to feel tired occasionally. No dizziness. No chest pain or palpitation. No active bleeding. PAST MEDICAL HISTORY: has a past medical history of Anxiety, Arthritis, Depression, Fibromyalgia, GERD (gastroesophageal reflux disease), Hyperlipidemia, Seizures ([address] Shoulder arthritis, and Vertigo, constant. PAST SURGICAL HISTORY: has a past surgical history that includes Tubal ligation; lumbar fusion; Carpal tunnel release (Bilateral); Cholecystectomy; knee surgery (Right); and cervical fusion.      CURRENT MEDICATIONS:  has a current medication list which includes the following prescription(s): b complex vitamins, vitamin b-6, melatonin-pyridoxine, tresiba flextouch, magnesium gluconate, acetaminophen, therapeutic multivitamin-minerals, cranberry-vitamin c-vitamin e, insulin aspart, biotin, buspirone, milk thistle, pantoprazole, trulicity, promethazine, mupirocin, pitavastatin, benazepril, aspirin low dose, tizanidine, metformin, levetiracetam, and duloxetine. ALLERGIES:  is allergic to latex, amantadines, codeine, darvocet a500 [propoxyphene n-acetaminophen], depakote [valproic acid], dye [iodides], loratadine, lyrica [pregabalin], nabumetone, pamelor [nortriptyline], sanctura [trospium], and wellbutrin [bupropion]. FAMILY HISTORY: Negative for any hematological or oncological conditions. SOCIAL HISTORY:  reports that she has never smoked. She has never used smokeless tobacco. She reports that she does not drink alcohol and does not use drugs. REVIEW OF SYSTEMS:     General: Positive for weakness and fatigue. No unanticipated weight loss or decreased appetite. No fever or chills. Eyes: No blurred vision, eye pain or double vision. Ears: No hearing problems or drainage. No tinnitus. Throat: No sore throat, problems with swallowing or dysphagia. Respiratory: No cough, sputum or hemoptysis. Positive for exertional shortness of breath. No pleuritic chest pain. Cardiovascular: No chest pain, orthopnea or PND. No lower extremity edema. No palpitation. Gastrointestinal: History of gastroparesis. No problems with swallowing. No abdominal pain or bloating. No nausea or vomiting. Positive for constipation. No GI bleeding. Genitourinary: No dysuria, hematuria, frequency or urgency. Musculoskeletal: No muscle aches or pains. No limitation of movement. No back pain. No gait disturbance, No joint complaints. Dermatologic: No skin rashes or pruritus. No skin lesions or discolorations. Psychiatric: No depression, anxiety, or stress or signs of schizophrenia. No change in mood or affect. Hematologic: No history of bleeding tendency. No bruises or ecchymosis. No history of clotting problems. Infectious disease: No fever, chills or frequent infections. Endocrine: No problems with opacity. No polydipsia or polyuria. No temperature intolerance. Neurologic: No headaches or dizziness. No weakness or numbness of the extremities. No changes in balance, coordination,  memory, mentation, behavior. Allergic/Immunologic: No nasal congestion or hives. No repeated infections. PHYSICAL EXAM:  The patient is not in acute distress. Vital signs: Blood pressure 124/61, pulse 98, weight 163 lb (73.9 kg). HEENT:  Eyes are normal. Ears, nose and throat are normal.  Neck: Supple. No lymph node enlargement. No thyroid enlargement. Trachea is centrally located. Chest:  Clear to auscultation. No wheezes or crepitations. Heart: Regular sinus rhythm. Abdomen: Soft, nontender. No hepatosplenomegaly. No masses. Extremities:  With no edema. Lymph Nodes:  No cervical, axillary or inguinal lymph node enlargement. Neurologic:  Conscious and oriented. No focal neurological deficits. Psychosocial: No depression, anxiety or stress. Skin: No rashes, bruises or ecchymoses.       Review of Diagnostic data:   Recent Labs     10/18/22  1308   WBC 5.0   HGB 12.7   HCT 41.3   MCV 94.7          Chemistry        Component Value Date/Time     10/25/2016 1145    K 4.0 10/25/2016 1145     10/25/2016 1145    CO2 26 10/25/2016 1145    BUN 15 10/25/2016 1145    CREATININE 0.72 10/25/2016 1145        Component Value Date/Time    CALCIUM 10.0 10/25/2016 1145    ALKPHOS 104 09/13/2016 1515    AST 24 09/13/2016 1515    ALT 18 09/13/2016 1515    BILITOT 0.28 (L) 09/13/2016 1515        Lab Results   Component Value Date    IRON 51 10/18/2022    TIBC 298 10/18/2022    FERRITIN 15 10/18/2022     Labs from outside facility showed evidence of severe microcytic anemia with low iron and low ferritin level. IMPRESSION:   Severe microcytic anemia  Severe iron deficiency  Intolerable side effects to oral iron  Gastroparesis  Constipation  Hemorrhoids  Anal fissure    PLAN: I reviewed the labs as above and discussed with the patient. I explained to the patient the nature of this hematologic problem. I explained the significance of these abnormalities in layman language. Reviewing lab tests or systemic outside facility showed evidence of chronic microcytic anemia. She was treated for iron deficiency anemia in the past with variable results. She could not tolerate the oral iron. She was given IV iron infusion. Repeated labs showed excellent response to treatment. Hemoglobin is now normal.  Iron studies are normal.   Patient continues to feel slightly tired. I think this is related to different etiology. At this point recommendation is for monitoring. We will repeat the labs with iron studies in 12 months. Patient's questions were answered to the best of her satisfaction and she verbalized full understanding and agreement.

## 2022-11-03 ENCOUNTER — TELEPHONE (OUTPATIENT)
Dept: ORTHOPEDIC SURGERY | Age: 67
End: 2022-11-03

## 2022-11-03 NOTE — TELEPHONE ENCOUNTER
Patient called to see if medical records were received from Dr. Gudino Signs office. Records not received yet at this time.

## 2023-01-27 ENCOUNTER — TELEPHONE (OUTPATIENT)
Dept: ORTHOPEDIC SURGERY | Age: 68
End: 2023-01-27

## 2023-01-27 NOTE — TELEPHONE ENCOUNTER
Patient called in about injections and prior auth. Medical records received from previous doctor and patient received Euflexxa injections.  Will start prior auth for injection

## 2023-02-06 ENCOUNTER — TELEPHONE (OUTPATIENT)
Dept: INFUSION THERAPY | Facility: MEDICAL CENTER | Age: 68
End: 2023-02-06

## 2023-02-06 NOTE — TELEPHONE ENCOUNTER
Returned call to patient. She states she went to her neurologist who said her copper levels are low. She was started on a copper supplement. Copper Citrate 2 mg PO daily. She has read that copper can keep iron levels low and would like Dr. Iwona Scott advice if she should be concerned. Latest labs were drawn at [facility]. Patient informed we will have latest labs from [facility] faxed to our office and will have Dr. Fredis Cummins review above information. She will be contacted once he reviews. Patient agreeable and voices understanding.

## 2023-02-07 ENCOUNTER — TELEPHONE (OUTPATIENT)
Dept: ORTHOPEDIC SURGERY | Age: 68
End: 2023-02-07

## 2023-02-07 NOTE — TELEPHONE ENCOUNTER
Patient called in requesting a call back from [facility] please advise and contact patient thank you!

## 2023-02-07 NOTE — TELEPHONE ENCOUNTER
Patient called stating she was approved for bilateral knee injections and is asking for a return call to discuss the type of injection she will be receiving and also if this is a three step process. Please advise. Thank you.

## 2023-02-07 NOTE — TELEPHONE ENCOUNTER
Called patient to inform her that her euflexxa injections were approved. Patient will call back to schedule appointment. Informed her that approval expires 2/22/23.  Verbalized understanding

## 2023-02-07 NOTE — TELEPHONE ENCOUNTER
Called patient and informed her that her injection is a 3 part series and it expires on 03/06/23. Patient should schedule appointment with Dr. Radha Guillory on either 02/13/23 or 2/14/23.  No answer left [facility]

## 2023-02-10 NOTE — TELEPHONE ENCOUNTER
Patient called back and informed that Dr. Manjeet Del Castillo is comfortable with present labs and will continue to monitor. Reminded of next appointment 10/19/23 with labs prior.

## 2023-02-14 ENCOUNTER — OFFICE VISIT (OUTPATIENT)
Dept: ORTHOPEDIC SURGERY | Age: 68
End: 2023-02-14

## 2023-02-14 VITALS — BODY MASS INDEX: 28.53 KG/M2 | WEIGHT: 161 LBS | HEIGHT: 63 IN

## 2023-02-14 DIAGNOSIS — M17.0 PRIMARY OSTEOARTHRITIS OF BOTH KNEES: Primary | ICD-10-CM

## 2023-02-14 NOTE — PROGRESS NOTES
This 78-year-old patient with known bilateral medial and mild patellofemoral compartment osteoarthrosis is seen here. Since last seen here the patient has had Euflexxa injection at 2 different facilities. She was more influenced by the fact that her daughter had been having those injections with success. At her last visit here I had asked her to find out from her daughter what the injection was because at that time she could not remember what it was. There was definitely some problem getting the information to us from Dr. Franklin Pagan. There were a lot of communications noted that the patient had contacted our office several times to find out if he had received the records. Patient is complaining of pain on the medial side of the knee joint bilaterally. Examination: There was no effusion on either side she has excellent range of motion in both the knees no instability but tender over the medial joint line. There is mild patellofemoral grating. Diagnosis: Bilateral medial and mild patellofemoral compartment osteoarthrosis. Treatment: Under sterile condition I injected first in the series of 3 Euflexxa and she tolerated the procedure well. We will see her again in a week's time for the second injection.

## 2023-02-21 ENCOUNTER — OFFICE VISIT (OUTPATIENT)
Dept: ORTHOPEDIC SURGERY | Age: 68
End: 2023-02-21
Payer: COMMERCIAL

## 2023-02-21 VITALS — BODY MASS INDEX: 28.63 KG/M2 | HEIGHT: 63 IN | WEIGHT: 161.6 LBS

## 2023-02-21 DIAGNOSIS — M17.0 PRIMARY OSTEOARTHRITIS OF BOTH KNEES: Primary | ICD-10-CM

## 2023-02-21 PROCEDURE — G8400 PT W/DXA NO RESULTS DOC: HCPCS | Performed by: ORTHOPAEDIC SURGERY

## 2023-02-21 PROCEDURE — 1090F PRES/ABSN URINE INCON ASSESS: CPT | Performed by: ORTHOPAEDIC SURGERY

## 2023-02-21 PROCEDURE — 1036F TOBACCO NON-USER: CPT | Performed by: ORTHOPAEDIC SURGERY

## 2023-02-21 PROCEDURE — 1123F ACP DISCUSS/DSCN MKR DOCD: CPT | Performed by: ORTHOPAEDIC SURGERY

## 2023-02-21 PROCEDURE — 99212 OFFICE O/P EST SF 10 MIN: CPT | Performed by: ORTHOPAEDIC SURGERY

## 2023-02-21 PROCEDURE — 3017F COLORECTAL CA SCREEN DOC REV: CPT | Performed by: ORTHOPAEDIC SURGERY

## 2023-02-21 PROCEDURE — G8427 DOCREV CUR MEDS BY ELIG CLIN: HCPCS | Performed by: ORTHOPAEDIC SURGERY

## 2023-02-21 PROCEDURE — G8417 CALC BMI ABV UP PARAM F/U: HCPCS | Performed by: ORTHOPAEDIC SURGERY

## 2023-02-21 PROCEDURE — G8484 FLU IMMUNIZE NO ADMIN: HCPCS | Performed by: ORTHOPAEDIC SURGERY

## 2023-02-21 PROCEDURE — 20610 DRAIN/INJ JOINT/BURSA W/O US: CPT | Performed by: ORTHOPAEDIC SURGERY

## 2023-02-21 RX ORDER — HYALURONATE SODIUM 10 MG/ML
20 SYRINGE (ML) INTRAARTICULAR ONCE
Status: COMPLETED | OUTPATIENT
Start: 2023-02-21 | End: 2023-02-21

## 2023-02-21 NOTE — PROGRESS NOTES
Set of 3 Euflexxa injection given into both the knees through anterolateral portal and under sterile condition. The patient did have some increased pain in the left knee after the last injection and had iced it and got better over about 2 to 3 days. See her again next week.

## 2023-02-28 ENCOUNTER — OFFICE VISIT (OUTPATIENT)
Dept: ORTHOPEDIC SURGERY | Age: 68
End: 2023-02-28
Payer: COMMERCIAL

## 2023-02-28 VITALS — BODY MASS INDEX: 28.81 KG/M2 | HEIGHT: 63 IN | WEIGHT: 162.6 LBS

## 2023-02-28 DIAGNOSIS — M25.561 PAIN IN BOTH KNEES, UNSPECIFIED CHRONICITY: Primary | ICD-10-CM

## 2023-02-28 DIAGNOSIS — M17.0 PRIMARY OSTEOARTHRITIS OF BOTH KNEES: ICD-10-CM

## 2023-02-28 DIAGNOSIS — M25.562 PAIN IN BOTH KNEES, UNSPECIFIED CHRONICITY: Primary | ICD-10-CM

## 2023-02-28 PROCEDURE — 20610 DRAIN/INJ JOINT/BURSA W/O US: CPT | Performed by: ORTHOPAEDIC SURGERY

## 2023-02-28 PROCEDURE — 99999 PR OFFICE/OUTPT VISIT,PROCEDURE ONLY: CPT | Performed by: ORTHOPAEDIC SURGERY

## 2023-02-28 RX ORDER — HYALURONATE SODIUM 10 MG/ML
20 SYRINGE (ML) INTRAARTICULAR ONCE
Status: COMPLETED | OUTPATIENT
Start: 2023-02-28 | End: 2023-02-28

## 2023-02-28 RX ADMIN — Medication 20 MG: at 16:09

## 2023-02-28 RX ADMIN — Medication 20 MG: at 16:10

## 2023-02-28 NOTE — PROGRESS NOTES
Patient states that the knees are feeling a little better. Third injection of Euflexxa given in both the knees through anterolateral portal under sterile condition without complication. See her as needed.

## 2023-03-13 ENCOUNTER — OFFICE VISIT (OUTPATIENT)
Dept: ORTHOPEDIC SURGERY | Age: 68
End: 2023-03-13

## 2023-03-13 VITALS — WEIGHT: 162 LBS | BODY MASS INDEX: 28.7 KG/M2 | HEIGHT: 63 IN

## 2023-03-13 DIAGNOSIS — M54.2 NECK PAIN: Primary | ICD-10-CM

## 2023-03-13 DIAGNOSIS — M47.22 OSTEOARTHRITIS OF SPINE WITH RADICULOPATHY, CERVICAL REGION: ICD-10-CM

## 2023-03-13 NOTE — PROGRESS NOTES
This is a 75-year-old lady is seen here complaining of pain in both the shoulders. Last time she was seen for pain in both the knees. She had corticosteroid injection in both. She says the left knee has definitely improved the right knee pain is now tolerable but is not completely gone. As for the shoulder the left shoulder is worse than the right. The pain is felt in the shoulder area also than in the mid upper arm area and then radiates sometimes down into the elbow fingers. She also feels like the funny bone sensation in the fingers on the left side. Nothing on the right side. On the right side the shoulder pain is again in the shoulder and the mid upper arm. She has difficulty lying on one or the other side. This has been ongoing for about 3 years. She attributes this to pulling cushions on the couch and vacuuming. The patient has had 2 surgeries in the neck. First 1 was 2013 and the second was 2019. In the first surgery she had a fusion carried out. Next surgery was carried out at the level below C5-6 level. Both the surgeries were carried out at [facility] by neurosurgeon. Examination: Cervical spine examination shows she has pain when moving the neck in all the direction except for lateral rotation to the right. Her flexion flexion to left and right is markedly limited. Shoulder examination shows that she was actively able to abduct the left shoulder over her head. However internal rotation behind her back is limited bilaterally. External rotation is equal bilaterally and is without any pain even against resistance. X-rays: Reviewed the x-rays of the shoulder which really showed no significant abnormality but x-rays of the cervical spine show she has a fusion C4-5 level. Diagnosis: Cervical spondylosis with radiculopathy. #2 adhesive capsulitis secondary to cervical spondylosis.     Treatment: She really was not interested in going back for another surgery and therefore I sent her for physical therapy and we will see her again in 4 weeks. Therapy is both for the neck as well as both the shoulders.

## 2023-03-22 ENCOUNTER — HOSPITAL ENCOUNTER (OUTPATIENT)
Dept: PHYSICAL THERAPY | Facility: CLINIC | Age: 68
Setting detail: THERAPIES SERIES
Discharge: HOME | End: 2023-03-22
Payer: COMMERCIAL

## 2023-03-22 PROCEDURE — 97161 PT EVAL LOW COMPLEX 20 MIN: CPT

## 2023-03-22 NOTE — PLAN OF CARE
Activity  55474 [] Vasopneumatic cold with compression  76717    [] Gait Training   [id number] [] Ultrasound   [id number]   [] Neuromuscular Re-education  [id number] [] Electrical Stimulation Unattended  28938   [x] Manual Therapy  04550 [] Electrical Stimulation Attended  [id number]   [x] Instruction in HEP  [x] Lumbar/Cervical Traction  [id number]   [] Aquatic Therapy   68298 [x] Cold/hotpack    [] Massage   58561      [] Dry Needling, 1 or 2 muscles  36807   [] Biofeedback, first 15 minutes   27503  [] Biofeedback, additional 15 minutes   43830 [] Dry Needling, 3 or more muscles  91382     [x]  Medication allergies reviewed for use of    Dexamethasone Sodium Phosphate 4mg/ml     with iontophoresis treatments. Pt is not allergic. Frequency:  2 x/week for 10 visits    Electronically signed by: Sree Interiano PT        Physician Signature:________________________________Date:__________________  By signing above or cosigning this note, I have reviewed this plan of care and certify a need for medically necessary rehabilitation services.      *PLEASE SIGN ABOVE AND FAX BACK ALL PAGES*

## 2023-03-22 NOTE — FLOWSHEET NOTE
Adis Fall Risk Assessment    Patient Name:  Veto Fuentes  : 1955    Risk Factor Scale  Score   History of Falls [] Yes  [x] No 25  0 0   Secondary Diagnosis [] Yes  [x] No 15  0 0   Ambulatory Aid [] Furniture  [] Crutches/cane/walker  [x] None/bedrest/wheelchair/nurse 30  15  0 0   IV/Heparin Lock [] Yes  [x] No 20  0 0   Gait/Transferring [] Impaired  [] Weak  [x] Normal/bedrest/immobile 20  10  0 0   Mental Status [] Forgets limitations  [x] Oriented to own ability 15  0 0      Total: 0     Based on the Assessment score: check the appropriate box.     [x]  No intervention needed   Low =   Score of 0-24    []  Use standard prevention interventions Moderate =  Score of 24-44   [] Give patient handout and discuss fall prevention strategies   [] Establish goal of education for patient/family RE: fall prevention strategies    []  Use high risk prevention interventions High = Score of 45 and higher   [] Give patient handout and discuss fall prevention strategies   [] Establish goal of education for patient/family Re: fall prevention strategies   [] Discuss lifeline / other resources    Electronically signed by:   Jose Antonio Khan, PT  Date: 3/22/2023

## 2023-03-22 NOTE — CONSULTS
13292 [] Dry Needling, 3 or more muscles  20561     [x]  Medication allergies reviewed for use of    Dexamethasone Sodium Phosphate 4mg/ml     with iontophoresis treatments. Pt is not allergic. Frequency:  2 x/week for 10 visits    Todays Treatment:  Modalities:   Precautions: latex allergy  Exercises:  Exercise Reps/ Time Weight/ Level Comments                                 Other:    Specific Instructions for next treatment: trial intermittent mechanical tracture (neutral position, 12# max pull), cervical ROM and shoulder IR/ER ROM, postural strengthening and reinforce postural awareness, consider manual MFR to bilateral UT and cervical paraspinals, MHP prn       Evaluation Complexity:  History (Personal factors, comorbidities) [] 0 [] 1-2 [x] 3+   Exam (limitations, restrictions) [] 1-2 [] 3 [x] 4+   Clinical presentation (progression) [x] Stable [] Evolving  [] Unstable   Decision Making [x] Low [] Moderate [] High    [x] Low Complexity [] Moderate Complexity [] High Complexity       Treatment Charges: Mins Units   [x] Evaluation       [x]  Low       []  Moderate       []  High 40 1   []  Modalities     []  Ther Exercise     []  Manual Therapy     []  Ther Activities     []  Aquatics     []  Vasocompression     []  Other       TOTAL TREATMENT TIME: 40 minutes     Time in: 2:50 pm      Time out: 3:30 pm     Electronically signed by: Flo Bella PT        Physician Signature:________________________________Date:__________________  By signing above or cosigning this note, I have reviewed this plan of care and certify a need for medically necessary rehabilitation services.      *PLEASE SIGN ABOVE AND FAX BACK ALL PAGES*

## 2023-03-30 ENCOUNTER — HOSPITAL ENCOUNTER (OUTPATIENT)
Dept: PHYSICAL THERAPY | Facility: CLINIC | Age: 68
Setting detail: THERAPIES SERIES
Discharge: HOME | End: 2023-03-30
Payer: COMMERCIAL

## 2023-03-30 PROCEDURE — 97140 MANUAL THERAPY 1/> REGIONS: CPT

## 2023-03-30 PROCEDURE — 97110 THERAPEUTIC EXERCISES: CPT

## 2023-03-30 PROCEDURE — 97012 MECHANICAL TRACTION THERAPY: CPT

## 2023-03-30 NOTE — FLOWSHEET NOTE
[] [facility]  [address]  [phone]  F: [phone] [x] [address]  [facility]   [address]  P: [phone]  F: [phone] [] [address]  [address]  P: [phone]  F: [phone] [] [address]  P: [phone]  F: [phone] [] [address]  [facility]   [address]   [place]: [phone]  F: [phone]      Physical Therapy Daily Treatment Note    Date:  3/30/2023  Patient Name:  Kimmie Roberts    :  1955  MRN: [id number]  Physician: Sandra Montalvo MD                           Insurance: Emilia Escobar, [facility] : [facility], 3/22/23 - 23  Medical Diagnosis: M47.22 (ICD-10-CM) - Osteoarthritis of spine with radiculopathy, cervical region                        Rehab Codes: M47.22, M25.512, M25.511, M62.81, R29.3  Onset Date: 2020                   Next 's appt. : 4/10/23     Visit# / total visits: 2/10; Progress note for Medicare patient due at visit 8    Cancels/No Shows:     Subjective:    Pain:  [x] Yes  [] No Location: C-spine Pain Rating: (0-10 scale) 4/10  Pain altered Tx:  [x] No  [] Yes  Action:  Comments: Pt presents with N/T down LLE to hand and down RLE to elbow this date. Reports neck pain isn't as bad today.      Objective:  Modalities: trial intermittent cervical traction in a neutral position 12# max pull, 0 min pull 60/20 sec with rolled towel under shoulders and bolster under BLE's for improved comfort x10'  Precautions: latex allergy  Exercises:  Exercise Reps/ Time Weight/ Level Comments   Seated       C-AROM 5x5\" ea  All planes    Retro shoulder rolls  20x     UT stretch 3x30\"     Levator stretch  3x30\"     Chin tuck 10x3\"     Scapular retraction  15x5\"     ER

## 2023-03-31 ENCOUNTER — HOSPITAL ENCOUNTER (OUTPATIENT)
Dept: PHYSICAL THERAPY | Facility: CLINIC | Age: 68
Setting detail: THERAPIES SERIES
Discharge: HOME | End: 2023-03-31
Payer: COMMERCIAL

## 2023-03-31 PROCEDURE — 97110 THERAPEUTIC EXERCISES: CPT

## 2023-03-31 PROCEDURE — 97140 MANUAL THERAPY 1/> REGIONS: CPT

## 2023-03-31 PROCEDURE — 97012 MECHANICAL TRACTION THERAPY: CPT

## 2023-03-31 NOTE — FLOWSHEET NOTE
[] [facility]  [address]  [phone]  F: [phone] [x] [address]  [facility] 36   [address]  P: [phone]  F: [phone] [] [address]  [address]  P: [phone]  F: [phone] [] [address]  P: [phone]  F: [phone] [] [address]  [facility]   [address]   [place]: [phone]  F: [phone]      Physical Therapy Daily Treatment Note    Date:  3/31/2023  Patient Name:  Carmen Pederson    :  1955  MRN: [id number]  Physician: Nyra Fothergill, MD                           Insurance: Desi Knee, [facility] : 97, 3/22/23 - 23  Medical Diagnosis: M47.22 (ICD-10-CM) - Osteoarthritis of spine with radiculopathy, cervical region                        Rehab Codes: M47.22, M25.512, M25.511, M62.81, R29.3  Onset Date: 2020                   Next 's appt. : 4/10/23     Visit# / total visits: 3/10; Progress note for Medicare patient due at visit 8    Cancels/No Shows:     Subjective:    Pain:  [x] Yes  [] No Location: C-spine Pain Rating: (0-10 scale) 5/10  Pain altered Tx:  [x] No  [] Yes  Action:    Comments: Pt reports feeling relief of pain for ~ 3 hours following last session. Pt takes a Azeri lesson on her phone and notes that this was when her pain returned.       Objective:  Modalities:  intermittent cervical traction in a neutral position 12# max pull, 0 min pull 60/20 sec with bolster under BLE's for improved comfort x10'  Precautions: latex allergy  Exercises:  Exercise Reps/ Time Weight/ Level Comments   Seated       C-AROM 5x5\" ea  All planes    Retro shoulder rolls  15x     UT stretch 3x30\"     Levator stretch  3x30\"     Chin tuck 10x3\"     Scapular

## 2023-04-03 ENCOUNTER — HOSPITAL ENCOUNTER (OUTPATIENT)
Dept: PHYSICAL THERAPY | Facility: CLINIC | Age: 68
Setting detail: THERAPIES SERIES
Discharge: HOME | End: 2023-04-03
Payer: COMMERCIAL

## 2023-04-03 PROCEDURE — 97140 MANUAL THERAPY 1/> REGIONS: CPT

## 2023-04-03 PROCEDURE — 97110 THERAPEUTIC EXERCISES: CPT

## 2023-04-03 PROCEDURE — 97012 MECHANICAL TRACTION THERAPY: CPT

## 2023-04-03 NOTE — FLOWSHEET NOTE
[] [facility]  [address]  [phone]  F: [phone] [x] [address]  [facility]   [address]  P: [phone]  F: [phone] [] [address]  [address]  P: [phone]  F: [phone] [] [address]  P: [phone]  F: [phone] [] [address]  [facility]   [address]   [place]: [phone]  F: [phone]      Physical Therapy Daily Treatment Note    Date:  4/3/2023  Patient Name:  Laura Lyon    :  1955  MRN: [id number]  Physician: Cherry Burger MD                           Insurance: [facility], [facility] : [facility], 3/22/23 - 23  Medical Diagnosis: M47.22 (ICD-10-CM) - Osteoarthritis of spine with radiculopathy, cervical region                        Rehab Codes: M47.22, M25.512, M25.511, M62.81, R29.3  Onset Date: 2020                   Next 's appt. : 4/10/23     Visit# / total visits: 4/10; Progress note for Medicare patient due at visit 6   Cancels/No Shows:     Subjective:    Pain:  [x] Yes  [] No Location: C-spine Pain Rating: (0-10 scale) 5/10  Pain altered Tx:  [x] No  [] Yes  Action:    Comments: Pt arrives noting ongoing pain at 5/10 stating that she feels better for approximately 3 hours after therapy but returns after doing her Albanian classes on her phone.      Objective:  Modalities:  intermittent cervical traction in a neutral position 12# max pull, 0 min pull 60/20 sec with bolster under BLE's for improved comfort x10'  Precautions: latex allergy  Exercises:  Exercise Reps/ Time Weight/ Level Comments   Seated       C-AROM 5x5\" ea  All planes    Retro shoulder rolls  15x     UT stretch 3x30\"     Levator stretch  3x30\"     Chin tuck 10x3\" lime Added

## 2023-04-06 ENCOUNTER — HOSPITAL ENCOUNTER (OUTPATIENT)
Dept: PHYSICAL THERAPY | Facility: CLINIC | Age: 68
Setting detail: THERAPIES SERIES
Discharge: HOME | End: 2023-04-06
Payer: COMMERCIAL

## 2023-04-06 PROCEDURE — 97140 MANUAL THERAPY 1/> REGIONS: CPT

## 2023-04-06 PROCEDURE — 97012 MECHANICAL TRACTION THERAPY: CPT

## 2023-04-06 PROCEDURE — 97110 THERAPEUTIC EXERCISES: CPT

## 2023-04-06 NOTE — FLOWSHEET NOTE
life.  Pt to demonstrate improved functional mobility with NDI score of 20% impaired or less. Pt to  report ability to read for as long as desired demonstrating appropriate postural awareness and without increased neck or shoulder pain. Pt to report ability to sleep with minimal disturbance (0-1) due to neck and shoulder pain to improve quality of life. Patient goals: reduce pain    Pt. Education:  [x] Yes  [] No  [x] Reviewed Prior HEP/Ed  Method of Education: [x] Verbal posture [x] Demo pec stretch   [] Written  Access Code: [email or web address]  URL: [employer]/  Date: 03/31/2023  Prepared by: Emmanuel Bevels    Exercises  - Seated Upper Trap Stretch  - 1 x daily - 7 x weekly - 3 reps - 30 seconds hold  - Gentle Levator Scapulae Stretch  - 1 x daily - 7 x weekly - 3 sets - 30 seconds  hold  - Seated Backward Shoulder Rolls  - 1 x daily - 7 x weekly - 2 sets - 10 reps  - Seated Scapular Retraction  - 1 x daily - 7 x weekly - 2 sets - 10 reps - 5 seconds hold  - Seated Cervical Retraction  - 1 x daily - 7 x weekly - 2 sets - 10 reps - 3 seconds  hold  - Shoulder extension with resistance - Neutral  - 1 x daily - 7 x weekly - 10 reps  - Standing Shoulder Row with Anchored Resistance  - 1 x daily - 7 x weekly - 10 reps  - Shoulder External Rotation and Scapular Retraction with Resistance  - 1 x daily - 7 x weekly - 10 reps  - Seated Shoulder Horizontal Abduction with Resistance - Palms Down  - 1 x daily - 7 x weekly - 10 reps    4/3- discussed postural modifications such as propping UEs on pillows or on counter to keep phone at eye level without straining BUTs    Comprehension of Education:  [x] Verbalizes understanding. [] Demonstrates understanding. [x] Needs review. [] Demonstrates/verbalizes HEP/Ed previously given. Plan: [x] Continue current frequency toward long and short term goals.     [] Specific Instructions for subsequent treatments:       Time In:  3:00 pm            Time Out: 4:10

## 2023-04-17 ENCOUNTER — HOSPITAL ENCOUNTER (OUTPATIENT)
Dept: PHYSICAL THERAPY | Facility: CLINIC | Age: 68
Setting detail: THERAPIES SERIES
Discharge: HOME | End: 2023-04-17
Payer: COMMERCIAL

## 2023-04-17 PROCEDURE — 97140 MANUAL THERAPY 1/> REGIONS: CPT

## 2023-04-17 PROCEDURE — 97110 THERAPEUTIC EXERCISES: CPT

## 2023-04-17 PROCEDURE — 97012 MECHANICAL TRACTION THERAPY: CPT

## 2023-04-17 NOTE — FLOWSHEET NOTE
- 7 x weekly - 10 reps  - Seated Shoulder Horizontal Abduction with Resistance - Palms Down  - 1 x daily - 7 x weekly - 10 reps    4/3- discussed postural modifications such as propping UEs on pillows or on counter to keep phone at eye level without straining BUTs    Date: 04/17/2023  Prepared by: Neris Melo    Exercises  - Seated Upper Trap Stretch  - 1 x daily - 7 x weekly - 3 reps - 30 seconds hold  - Gentle Levator Scapulae Stretch  - 1 x daily - 7 x weekly - 3 sets - 30 seconds  hold  - Seated Backward Shoulder Rolls  - 1 x daily - 7 x weekly - 2 sets - 10 reps  - Seated Scapular Retraction  - 1 x daily - 7 x weekly - 2 sets - 10 reps - 5 seconds hold  - Seated Cervical Retraction  - 1 x daily - 7 x weekly - 2 sets - 10 reps - 3 seconds  hold  - Shoulder extension with resistance - Neutral  - 1 x daily - 7 x weekly - 10 reps  - Standing Shoulder Row with Anchored Resistance  - 1 x daily - 7 x weekly - 10 reps  - Shoulder Internal Rotation with Resistance  - 1 x daily - 7 x weekly - 3 sets - 10 reps  - Shoulder External Rotation with Anchored Resistance  - 1 x daily - 7 x weekly - 3 sets - 10 reps  - Shoulder External Rotation and Scapular Retraction with Resistance  - 1 x daily - 7 x weekly - 10 reps  - Seated Shoulder Horizontal Abduction with Resistance - Palms Down  - 1 x daily - 7 x weekly - 10 reps  - Standing Scapular Retraction  - 1 x daily - 7 x weekly - 2 sets - 10 reps  - Doorway Pec Stretch at 60 Elevation  - 1 x daily - 7 x weekly - 3 sets - 30\" hold  - Cervical Retraction with Resistance  - 1 x daily - 7 x weekly - 10 reps - 3\" hold  - Standing Isometric Cervical Retraction with Chin Tucks and Ball at [facility]  - 1 x daily - 7 x weekly - 10 reps - 3\" hold    Comprehension of Education:  [x] Verbalizes understanding. [x] Demonstrates understanding. [] Needs review. [x] Demonstrates/verbalizes HEP/Ed previously given. Plan: [x] Continue current frequency toward long and short term goals.

## 2023-04-20 ENCOUNTER — HOSPITAL ENCOUNTER (OUTPATIENT)
Dept: PHYSICAL THERAPY | Facility: CLINIC | Age: 68
Setting detail: THERAPIES SERIES
Discharge: HOME | End: 2023-04-20
Payer: COMMERCIAL

## 2023-04-20 PROCEDURE — 97110 THERAPEUTIC EXERCISES: CPT

## 2023-04-20 PROCEDURE — 97140 MANUAL THERAPY 1/> REGIONS: CPT

## 2023-04-20 NOTE — FLOWSHEET NOTE
[] [facility] [facility]  [address]  [phone]  F: [phone] [x] [address]  [place] [address]   [address]  P: [phone]  F: [phone] [] [address]  [address]  P: [phone]  F: [phone] [] [address]  P: [phone]  F: [phone] [] [address]  [facility]   [address]   [place]: [phone]  F: [phone]      Physical Therapy Daily Treatment Note    Date:  2023  Patient Name:  Helena Holden    :  1955  MRN: [id number]  Physician: Marce Early MD                           Insurance: [facility] : [id number], 3/22/23 - 23  Medical Diagnosis: M47.22 (ICD-10-CM) - Osteoarthritis of spine with radiculopathy, cervical region                        Rehab Codes: M47.22, M25.512, M25.511, M62.81, R29.3  Onset Date: 2020                   Next 's appt.:      Visit# / total visits: 9/10; Progress note for Medicare patient due at visit 10  Cancels/No Shows:     Subjective:    Pain:  [x] Yes  [] No Location: C-spine Pain Rating: (0-10 scale) 7/10  Pain altered Tx:  [] No  [x] Yes  Action: held exercise progressions     Comments: Pt arrives with increased pain levels. Reports N/T down L UE. Felt good after last session until later that night she had soreness and trouble sleeping. More pain on R side of neck but more problems with the L side of her body.      Objective:  Modalities:  intermittent cervical traction in a neutral position 12# max pull, 0 min pull 60/20 sec with bolster under BLE's for improved comfort x15' - not today 23    Precautions: latex allergy  Exercises:  Exercise Reps/ Time Weight/ Level Comments   UBE  /'  New - fwd, retro ; not

## 2023-04-24 ENCOUNTER — HOSPITAL ENCOUNTER (OUTPATIENT)
Dept: PHYSICAL THERAPY | Facility: CLINIC | Age: 68
Setting detail: THERAPIES SERIES
Discharge: HOME | End: 2023-04-24
Payer: COMMERCIAL

## 2023-04-24 PROCEDURE — 97110 THERAPEUTIC EXERCISES: CPT

## 2023-04-24 PROCEDURE — 97140 MANUAL THERAPY 1/> REGIONS: CPT

## 2023-04-24 PROCEDURE — 97012 MECHANICAL TRACTION THERAPY: CPT

## 2023-04-24 NOTE — DISCHARGE SUMMARY
[] [facility]) [facility] &  Therapy  [address]  [phone]  F: [phone] [x] [address]  [place] [address]   [address]  P: [phone]  F: [phone] [] [place]  [address]  P: [phone]  F: [phone] [] [email or web address]  P: [phone]  F: [phone] [] [address]  [facility]   [address]   [place]: [phone]  F: [phone]      Physical Therapy Discharge Note    Date: 2023      Patient: Malcom Hockey  : 1955  MRN: [id number]    Physician: Catia Stover MD                           [id number] 3/22/23 - 23  Medical Diagnosis: M47.22 (ICD-10-CM) - Osteoarthritis of spine with radiculopathy, cervical region                        Rehab Codes: M47.22, M25.512, M25.511, M62.81, R29.3  Onset Date: 2020                   Next 's appt.:      Visit# / total visits: 10/10; Progress note for Medicare patient due at visit 10  Cancels/No Shows:   Date of initial visit: 23                Date of final visit: 23    Subjective:    Pain:  [x] Yes  [] No   Location: C-spine        Pain Rating: (0-10 scale) 3/10  Pain altered Tx:  [] No  [x] Yes  Action: held exercise progressions      Comments: Pt arrives noting reduced pain this date. Continues to increase with excessive activity especially with crocheting. Pt describes ongoing difficulty sleeping with minimal improvement in pain or radicular symptoms overall since starting therapy despite short term improvement with traction.      Objective:  Test Measurements:  Objective: Reassessed by primary PT                      STRENGTH   ROM     Left Right Cervical     C5 Shld Abd 4+/5 4+/5 Flexion 49   Shld

## 2023-04-24 NOTE — FLOWSHEET NOTE
[] [facility] [facility]  [address]  [phone]  F: [phone] [x] [address]  [place] [address]   [address]  P: [phone]  F: [phone] [] [address]  [address] [facility] [address]  P: [phone]  F: [phone] [] [address]  P: [phone]  F: [phone] [] [address]  [facility]   [address]   [place]: [phone]  F: [phone]      Physical Therapy Daily Treatment Note    Date:  2023  Patient Name:  Shy Burgess    :  1955  MRN: [id number]  Physician: Joe Avila MD                           Insurance: [facility], [facility] : [facility], 3/22/23 - 23  Medical Diagnosis: M47.22 (ICD-10-CM) - Osteoarthritis of spine with radiculopathy, cervical region                        Rehab Codes: M47.22, M25.512, M25.511, M62.81, R29.3  Onset Date: 2020                   Next 's appt.:      Visit# / total visits: 10/10; Progress note for Medicare patient due at visit 10  Cancels/No Shows:     Subjective:    Pain:  [x] Yes  [] No Location: C-spine Pain Rating: (0-10 scale) 3/10  Pain altered Tx:  [] No  [x] Yes  Action: held exercise progressions     Comments: Pt arrives noting reduced pain this date. Continues to increase with excessive activity especially with crocheting. Pt describes ongoing difficulty sleeping with minimal improvement in pain or radicular symptoms overall since starting therapy despite short term improvement with traction.      Objective:  Modalities:  intermittent cervical traction in a neutral position 12# max pull, 0 min pull 60/20 sec with bolster under BLE's for improved comfort x15'     Precautions: latex allergy  Exercises:  Exercise Reps/ Time Weight/ Level Comments

## 2023-04-27 ENCOUNTER — APPOINTMENT (OUTPATIENT)
Dept: PHYSICAL THERAPY | Facility: CLINIC | Age: 68
End: 2023-04-27
Payer: COMMERCIAL

## 2023-05-08 ENCOUNTER — OFFICE VISIT (OUTPATIENT)
Dept: ORTHOPEDIC SURGERY | Age: 68
End: 2023-05-08

## 2023-05-08 VITALS — BODY MASS INDEX: 28.7 KG/M2 | HEIGHT: 63 IN | WEIGHT: 162 LBS

## 2023-05-08 DIAGNOSIS — M47.22 OSTEOARTHRITIS OF SPINE WITH RADICULOPATHY, CERVICAL REGION: Primary | ICD-10-CM

## 2023-05-08 NOTE — PROGRESS NOTES
This patient with known cervical spondylosis and who has had 2 surgeries at [facility] is seen here in follow-up. The patient still has some pain in the shoulder. Her main concern was the ability to sleep comfortably at night. The patient has also been using the rigid cervical collar intermittently as a form of cervical traction. This also helps her. She has no radicular symptoms. Examination: Her cervical spine motions are still limited and do reproduce pain in the shoulder. Her shoulder motions are excellent demonstrating full range bilaterally. Diagnosis: Cervical spondylosis with referred pain to the shoulder. Treatment: As for the sleeping I told her to find the most comfortable position she can find and use that may have to try with 1-2 or 3 pillows depending upon which gives her the most support. She may continue using the rigid support intermittently. She continues to use Zanaflex. She particularly was interested in getting cream with the local anesthetic in it but she was going to find out exactly what type of the cream she wanted and will be happy to prescribe it. Additionally she had left her CD from [employer] here but we are unable to locate it and I suggested that they cannot get another one from [address] [place] [address]. I will see her as needed.

## 2023-10-12 ENCOUNTER — HOSPITAL ENCOUNTER (OUTPATIENT)
Age: 68
Discharge: HOME | End: 2023-10-12
Payer: COMMERCIAL

## 2023-10-12 LAB
BASOPHILS # BLD: 0.03 K/UL (ref 0–0.2)
BASOPHILS NFR BLD: 1 % (ref 0–2)
EOSINOPHIL # BLD: 0.05 K/UL (ref 0–0.44)
EOSINOPHILS RELATIVE PERCENT: 1 % (ref 1–4)
ERYTHROCYTE [DISTWIDTH] IN BLOOD BY AUTOMATED COUNT: 14.3 % (ref 11.8–14.4)
FERRITIN SERPL-MCNC: 18 NG/ML (ref 13–150)
HCT VFR BLD AUTO: 37.5 % (ref 36.3–47.1)
HGB BLD-MCNC: 11.9 G/DL (ref 11.9–15.1)
IMM GRANULOCYTES # BLD AUTO: 0 K/UL (ref 0–0.3)
IMM GRANULOCYTES NFR BLD: 0 %
IRON SATN MFR SERPL: 16 % (ref 20–55)
IRON SERPL-MCNC: 54 UG/DL (ref 37–145)
LYMPHOCYTES NFR BLD: 1.3 K/UL (ref 1.1–3.7)
LYMPHOCYTES RELATIVE PERCENT: 30 % (ref 24–43)
MCH RBC QN AUTO: 28.3 PG (ref 25.2–33.5)
MCHC RBC AUTO-ENTMCNC: 31.7 G/DL (ref 28.4–34.8)
MCV RBC AUTO: 89.1 FL (ref 82.6–102.9)
MONOCYTES NFR BLD: 0.44 K/UL (ref 0.1–1.2)
MONOCYTES NFR BLD: 10 % (ref 3–12)
NEUTROPHILS NFR BLD: 58 % (ref 36–65)
NEUTS SEG NFR BLD: 2.53 K/UL (ref 1.5–8.1)
NRBC BLD-RTO: 0 PER 100 WBC
PLATELET # BLD AUTO: 143 K/UL (ref 138–453)
PMV BLD AUTO: 10.1 FL (ref 8.1–13.5)
RBC # BLD AUTO: 4.21 M/UL (ref 3.95–5.11)
TIBC SERPL-MCNC: 336 UG/DL (ref 250–450)
UNSATURATED IRON BINDING CAPACITY: 282 UG/DL (ref 112–347)
WBC OTHER # BLD: 4.4 K/UL (ref 3.5–11.3)

## 2023-10-12 PROCEDURE — 36415 COLL VENOUS BLD VENIPUNCTURE: CPT

## 2023-10-12 PROCEDURE — 83550 IRON BINDING TEST: CPT

## 2023-10-12 PROCEDURE — 82728 ASSAY OF FERRITIN: CPT

## 2023-10-12 PROCEDURE — 85025 COMPLETE CBC W/AUTO DIFF WBC: CPT

## 2023-10-12 PROCEDURE — 83540 ASSAY OF IRON: CPT

## 2023-10-19 ENCOUNTER — OFFICE VISIT (OUTPATIENT)
Dept: ONCOLOGY | Age: 68
End: 2023-10-19
Payer: COMMERCIAL

## 2023-10-19 ENCOUNTER — TELEPHONE (OUTPATIENT)
Dept: ONCOLOGY | Age: 68
End: 2023-10-19

## 2023-10-19 VITALS
DIASTOLIC BLOOD PRESSURE: 66 MMHG | RESPIRATION RATE: 16 BRPM | HEART RATE: 105 BPM | SYSTOLIC BLOOD PRESSURE: 142 MMHG | TEMPERATURE: 97.7 F | BODY MASS INDEX: 28.04 KG/M2 | WEIGHT: 158.3 LBS

## 2023-10-19 DIAGNOSIS — E61.1 IRON DEFICIENCY: Primary | ICD-10-CM

## 2023-10-19 DIAGNOSIS — K90.49 MALABSORPTION DUE TO INTOLERANCE, NOT ELSEWHERE CLASSIFIED: ICD-10-CM

## 2023-10-19 PROCEDURE — 3017F COLORECTAL CA SCREEN DOC REV: CPT | Performed by: INTERNAL MEDICINE

## 2023-10-19 PROCEDURE — G8427 DOCREV CUR MEDS BY ELIG CLIN: HCPCS | Performed by: INTERNAL MEDICINE

## 2023-10-19 PROCEDURE — 1123F ACP DISCUSS/DSCN MKR DOCD: CPT | Performed by: INTERNAL MEDICINE

## 2023-10-19 PROCEDURE — G8484 FLU IMMUNIZE NO ADMIN: HCPCS | Performed by: INTERNAL MEDICINE

## 2023-10-19 PROCEDURE — 99211 OFF/OP EST MAY X REQ PHY/QHP: CPT | Performed by: INTERNAL MEDICINE

## 2023-10-19 PROCEDURE — G8400 PT W/DXA NO RESULTS DOC: HCPCS | Performed by: INTERNAL MEDICINE

## 2023-10-19 PROCEDURE — 1090F PRES/ABSN URINE INCON ASSESS: CPT | Performed by: INTERNAL MEDICINE

## 2023-10-19 PROCEDURE — G8417 CALC BMI ABV UP PARAM F/U: HCPCS | Performed by: INTERNAL MEDICINE

## 2023-10-19 PROCEDURE — 99214 OFFICE O/P EST MOD 30 MIN: CPT | Performed by: INTERNAL MEDICINE

## 2023-10-19 PROCEDURE — 1036F TOBACCO NON-USER: CPT | Performed by: INTERNAL MEDICINE

## 2023-10-19 RX ORDER — CELECOXIB 200 MG/1
CAPSULE ORAL
COMMUNITY
Start: 2023-10-09

## 2023-10-19 RX ORDER — LOSARTAN POTASSIUM 100 MG/1
TABLET ORAL
COMMUNITY
Start: 2023-10-09

## 2023-10-19 RX ORDER — TIZANIDINE HYDROCHLORIDE 4 MG/1
TABLET ORAL
COMMUNITY
Start: 2023-08-30

## 2023-10-19 NOTE — TELEPHONE ENCOUNTER
Shaniqua Castillo MD VISIT  RV 12 months with labs before RV  LABS CDP FERRITIN FE TIBC ORDERS WILL BE MAILED TO PT TO BE DONE BEFORE RV 10/24/24  MD VISIT 10/24/24 @ 1PM  AVS PRINTED W/ INSTRUCTIONS AND GIVEN TO PT ON EXIT

## 2023-10-30 DIAGNOSIS — E61.1 IRON DEFICIENCY: Primary | ICD-10-CM

## 2024-10-17 ENCOUNTER — HOSPITAL ENCOUNTER (OUTPATIENT)
Age: 69
Discharge: HOME | End: 2024-10-17
Payer: COMMERCIAL

## 2024-10-17 DIAGNOSIS — E61.1 IRON DEFICIENCY: ICD-10-CM

## 2024-10-17 LAB
BASOPHILS # BLD: 0.06 K/UL (ref 0–0.2)
BASOPHILS NFR BLD: 1 % (ref 0–2)
EOSINOPHIL # BLD: 0.09 K/UL (ref 0–0.44)
EOSINOPHILS RELATIVE PERCENT: 2 % (ref 1–4)
ERYTHROCYTE [DISTWIDTH] IN BLOOD BY AUTOMATED COUNT: 13.6 % (ref 11.8–14.4)
FERRITIN SERPL-MCNC: 37 NG/ML (ref 13–150)
HCT VFR BLD AUTO: 40.7 % (ref 36.3–47.1)
HGB BLD-MCNC: 12.8 G/DL (ref 11.9–15.1)
IMM GRANULOCYTES # BLD AUTO: 0.01 K/UL (ref 0–0.3)
IMM GRANULOCYTES NFR BLD: 0 %
IRON SATN MFR SERPL: 20 % (ref 20–55)
IRON SERPL-MCNC: 78 UG/DL (ref 37–145)
LYMPHOCYTES NFR BLD: 1.31 K/UL (ref 1.1–3.7)
LYMPHOCYTES RELATIVE PERCENT: 27 % (ref 24–43)
MCH RBC QN AUTO: 28.6 PG (ref 25.2–33.5)
MCHC RBC AUTO-ENTMCNC: 31.4 G/DL (ref 28.4–34.8)
MCV RBC AUTO: 90.8 FL (ref 82.6–102.9)
MONOCYTES NFR BLD: 0.54 K/UL (ref 0.1–1.2)
MONOCYTES NFR BLD: 11 % (ref 3–12)
NEUTROPHILS NFR BLD: 59 % (ref 36–65)
NEUTS SEG NFR BLD: 2.9 K/UL (ref 1.5–8.1)
NRBC BLD-RTO: 0 PER 100 WBC
PLATELET # BLD AUTO: 183 K/UL (ref 138–453)
PMV BLD AUTO: 9.7 FL (ref 8.1–13.5)
RBC # BLD AUTO: 4.48 M/UL (ref 3.95–5.11)
TIBC SERPL-MCNC: 399 UG/DL (ref 250–450)
UNSATURATED IRON BINDING CAPACITY: 321 UG/DL (ref 112–347)
WBC OTHER # BLD: 4.9 K/UL (ref 3.5–11.3)

## 2024-10-17 PROCEDURE — 36415 COLL VENOUS BLD VENIPUNCTURE: CPT

## 2024-10-17 PROCEDURE — 83550 IRON BINDING TEST: CPT

## 2024-10-17 PROCEDURE — 83540 ASSAY OF IRON: CPT

## 2024-10-17 PROCEDURE — 85025 COMPLETE CBC W/AUTO DIFF WBC: CPT

## 2024-10-17 PROCEDURE — 82728 ASSAY OF FERRITIN: CPT

## 2024-10-24 ENCOUNTER — OFFICE VISIT (OUTPATIENT)
Dept: ONCOLOGY | Age: 69
End: 2024-10-24
Payer: COMMERCIAL

## 2024-10-24 ENCOUNTER — TELEPHONE (OUTPATIENT)
Dept: ONCOLOGY | Age: 69
End: 2024-10-24

## 2024-10-24 VITALS
WEIGHT: 148.2 LBS | TEMPERATURE: 96.3 F | HEART RATE: 97 BPM | DIASTOLIC BLOOD PRESSURE: 66 MMHG | SYSTOLIC BLOOD PRESSURE: 137 MMHG | BODY MASS INDEX: 26.25 KG/M2 | RESPIRATION RATE: 16 BRPM

## 2024-10-24 DIAGNOSIS — K90.49 MALABSORPTION DUE TO INTOLERANCE, NOT ELSEWHERE CLASSIFIED: ICD-10-CM

## 2024-10-24 DIAGNOSIS — E61.1 IRON DEFICIENCY: Primary | ICD-10-CM

## 2024-10-24 PROCEDURE — G8419 CALC BMI OUT NRM PARAM NOF/U: HCPCS | Performed by: INTERNAL MEDICINE

## 2024-10-24 PROCEDURE — 1159F MED LIST DOCD IN RCRD: CPT | Performed by: INTERNAL MEDICINE

## 2024-10-24 PROCEDURE — 1090F PRES/ABSN URINE INCON ASSESS: CPT | Performed by: INTERNAL MEDICINE

## 2024-10-24 PROCEDURE — 99214 OFFICE O/P EST MOD 30 MIN: CPT | Performed by: INTERNAL MEDICINE

## 2024-10-24 PROCEDURE — G8400 PT W/DXA NO RESULTS DOC: HCPCS | Performed by: INTERNAL MEDICINE

## 2024-10-24 PROCEDURE — 1125F AMNT PAIN NOTED PAIN PRSNT: CPT | Performed by: INTERNAL MEDICINE

## 2024-10-24 PROCEDURE — 1036F TOBACCO NON-USER: CPT | Performed by: INTERNAL MEDICINE

## 2024-10-24 PROCEDURE — 99211 OFF/OP EST MAY X REQ PHY/QHP: CPT

## 2024-10-24 PROCEDURE — G8427 DOCREV CUR MEDS BY ELIG CLIN: HCPCS | Performed by: INTERNAL MEDICINE

## 2024-10-24 PROCEDURE — 1123F ACP DISCUSS/DSCN MKR DOCD: CPT | Performed by: INTERNAL MEDICINE

## 2024-10-24 PROCEDURE — 3017F COLORECTAL CA SCREEN DOC REV: CPT | Performed by: INTERNAL MEDICINE

## 2024-10-24 PROCEDURE — G8484 FLU IMMUNIZE NO ADMIN: HCPCS | Performed by: INTERNAL MEDICINE

## 2024-10-24 RX ORDER — ALENDRONATE SODIUM 35 MG/1
TABLET ORAL
COMMUNITY

## 2024-10-24 RX ORDER — LISINOPRIL 30 MG/1
TABLET ORAL
COMMUNITY

## 2024-10-24 NOTE — PROGRESS NOTES
surgery (Right); and cervical fusion.     CURRENT MEDICATIONS:  has a current medication list which includes the following prescription(s): magnesium citrate, lisinopril, alendronate, ginseng, turmeric-constance, zanaflex, celecoxib, cyanocobalamin, melatonin-pyridoxine, tresiba flextouch, acetaminophen, insulin aspart, buspirone, pantoprazole, trulicity, promethazine, mupirocin, pitavastatin, aspirin low dose, metformin, levetiracetam, duloxetine, losartan, and omega-3 fatty acids.    ALLERGIES:  is allergic to latex, amantadines, codeine, darvocet a500 [propoxyphene n-acetaminophen], depakote [valproic acid], dye [iodides], loratadine, lyrica [pregabalin], nabumetone, pamelor [nortriptyline], sanctura [trospium], and wellbutrin [bupropion].    FAMILY HISTORY: Negative for any hematological or oncological conditions.     SOCIAL HISTORY:  reports that she has never smoked. She has never used smokeless tobacco. She reports that she does not drink alcohol and does not use drugs.    REVIEW OF SYSTEMS:     General: Positive for weakness and fatigue. No unanticipated weight loss or decreased appetite. No fever or chills.   Eyes: No blurred vision, eye pain or double vision.   Ears: No hearing problems or drainage. No tinnitus.   Throat: No sore throat, problems with swallowing or dysphagia.   Respiratory: No cough, sputum or hemoptysis.  Positive for exertional shortness of breath. No pleuritic chest pain.     Cardiovascular: No chest pain, orthopnea or PND. No lower extremity edema. No palpitation.   Gastrointestinal: History of gastroparesis.  No problems with swallowing. No abdominal pain or bloating. No nausea or vomiting.  Positive for constipation. No GI bleeding.   Genitourinary: No dysuria, hematuria, frequency or urgency.     Musculoskeletal: No muscle aches or pains. No limitation of movement. No back pain. No gait disturbance, No joint complaints.  Dermatologic: No skin rashes or pruritus. No skin lesions or 
yes

## 2024-10-24 NOTE — TELEPHONE ENCOUNTER
JOSE HERE FOR FOLLOW UP   RV 12 months with labs before RV  LABS ORDERED: CBC FERRITIN IRON & TIBC   MD VISIT: 10/23/25 @ 1:15PM   LABS WILL BE MAILED TO PT   AVS PRINTED AND GIVEN ON EXIT

## 2025-08-03 ENCOUNTER — APPOINTMENT (OUTPATIENT)
Dept: GENERAL RADIOLOGY | Age: 70
End: 2025-08-03
Payer: COMMERCIAL

## 2025-08-03 ENCOUNTER — APPOINTMENT (OUTPATIENT)
Dept: CT IMAGING | Age: 70
End: 2025-08-03
Payer: COMMERCIAL

## 2025-08-03 ENCOUNTER — HOSPITAL ENCOUNTER (EMERGENCY)
Age: 70
Discharge: HOME | End: 2025-08-03
Attending: STUDENT IN AN ORGANIZED HEALTH CARE EDUCATION/TRAINING PROGRAM
Payer: COMMERCIAL

## 2025-08-03 VITALS
SYSTOLIC BLOOD PRESSURE: 130 MMHG | RESPIRATION RATE: 16 BRPM | WEIGHT: 146 LBS | BODY MASS INDEX: 25.87 KG/M2 | TEMPERATURE: 98.4 F | HEIGHT: 63 IN | OXYGEN SATURATION: 94 % | HEART RATE: 87 BPM | DIASTOLIC BLOOD PRESSURE: 67 MMHG

## 2025-08-03 DIAGNOSIS — U07.1 COVID-19: Primary | ICD-10-CM

## 2025-08-03 DIAGNOSIS — R55 SYNCOPE AND COLLAPSE: ICD-10-CM

## 2025-08-03 DIAGNOSIS — E86.0 DEHYDRATION: ICD-10-CM

## 2025-08-03 LAB
ALBUMIN SERPL-MCNC: 4.1 G/DL (ref 3.5–5.2)
ALBUMIN/GLOB SERPL: 1.5 {RATIO} (ref 1–2.5)
ALP SERPL-CCNC: 42 U/L (ref 35–104)
ALT SERPL-CCNC: 30 U/L (ref 10–35)
ANION GAP SERPL CALCULATED.3IONS-SCNC: 16 MMOL/L (ref 9–16)
AST SERPL-CCNC: 30 U/L (ref 10–35)
BASOPHILS # BLD: 0.04 K/UL (ref 0–0.2)
BASOPHILS NFR BLD: 1 % (ref 0–2)
BILIRUB SERPL-MCNC: 0.4 MG/DL (ref 0–1.2)
BNP SERPL-MCNC: 818 PG/ML (ref 0–125)
BUN SERPL-MCNC: 14 MG/DL (ref 8–23)
CALCIUM SERPL-MCNC: 8.8 MG/DL (ref 8.8–10.2)
CHLORIDE SERPL-SCNC: 98 MMOL/L (ref 98–107)
CO2 SERPL-SCNC: 20 MMOL/L (ref 20–31)
CREAT SERPL-MCNC: 1.3 MG/DL (ref 0.5–0.9)
EOSINOPHIL # BLD: 0.04 K/UL (ref 0–0.44)
EOSINOPHILS RELATIVE PERCENT: 1 % (ref 1–4)
ERYTHROCYTE [DISTWIDTH] IN BLOOD BY AUTOMATED COUNT: 14.8 % (ref 11.8–14.4)
GFR, ESTIMATED: 46 ML/MIN/1.73M2
GLUCOSE SERPL-MCNC: 156 MG/DL (ref 82–115)
HCT VFR BLD AUTO: 30.5 % (ref 36.3–47.1)
HGB BLD-MCNC: 9.9 G/DL (ref 11.9–15.1)
IMM GRANULOCYTES # BLD AUTO: 0 K/UL (ref 0–0.3)
IMM GRANULOCYTES NFR BLD: 0 %
LYMPHOCYTES NFR BLD: 0.62 K/UL (ref 1.1–3.7)
LYMPHOCYTES RELATIVE PERCENT: 14 % (ref 24–43)
MCH RBC QN AUTO: 28.4 PG (ref 25.2–33.5)
MCHC RBC AUTO-ENTMCNC: 32.5 G/DL (ref 28.4–34.8)
MCV RBC AUTO: 87.6 FL (ref 82.6–102.9)
MONOCYTES NFR BLD: 0.7 K/UL (ref 0.1–1.2)
MONOCYTES NFR BLD: 16 % (ref 3–12)
NEUTROPHILS NFR BLD: 68 % (ref 36–65)
NEUTS SEG NFR BLD: 3 K/UL (ref 1.5–8.1)
NRBC BLD-RTO: 0 PER 100 WBC
PLATELET # BLD AUTO: 95 K/UL (ref 138–453)
PMV BLD AUTO: 10.4 FL (ref 8.1–13.5)
POTASSIUM SERPL-SCNC: 3.4 MMOL/L (ref 3.7–5.3)
PROT SERPL-MCNC: 6.9 G/DL (ref 6.6–8.7)
RBC # BLD AUTO: 3.48 M/UL (ref 3.95–5.11)
SODIUM SERPL-SCNC: 133 MMOL/L (ref 136–145)
TROPONIN I SERPL HS-MCNC: 11 NG/L (ref 0–14)
WBC OTHER # BLD: 4.4 K/UL (ref 3.5–11.3)

## 2025-08-03 PROCEDURE — 84484 ASSAY OF TROPONIN QUANT: CPT

## 2025-08-03 PROCEDURE — 83880 ASSAY OF NATRIURETIC PEPTIDE: CPT

## 2025-08-03 PROCEDURE — 85025 COMPLETE CBC W/AUTO DIFF WBC: CPT

## 2025-08-03 PROCEDURE — 6360000002 HC RX W HCPCS: Performed by: STUDENT IN AN ORGANIZED HEALTH CARE EDUCATION/TRAINING PROGRAM

## 2025-08-03 PROCEDURE — 96374 THER/PROPH/DIAG INJ IV PUSH: CPT

## 2025-08-03 PROCEDURE — 96375 TX/PRO/DX INJ NEW DRUG ADDON: CPT

## 2025-08-03 PROCEDURE — 2580000003 HC RX 258: Performed by: STUDENT IN AN ORGANIZED HEALTH CARE EDUCATION/TRAINING PROGRAM

## 2025-08-03 PROCEDURE — 80053 COMPREHEN METABOLIC PANEL: CPT

## 2025-08-03 PROCEDURE — 70486 CT MAXILLOFACIAL W/O DYE: CPT

## 2025-08-03 PROCEDURE — 96361 HYDRATE IV INFUSION ADD-ON: CPT

## 2025-08-03 PROCEDURE — 71045 X-RAY EXAM CHEST 1 VIEW: CPT

## 2025-08-03 PROCEDURE — 72125 CT NECK SPINE W/O DYE: CPT

## 2025-08-03 PROCEDURE — 93005 ELECTROCARDIOGRAM TRACING: CPT | Performed by: STUDENT IN AN ORGANIZED HEALTH CARE EDUCATION/TRAINING PROGRAM

## 2025-08-03 PROCEDURE — 70450 CT HEAD/BRAIN W/O DYE: CPT

## 2025-08-03 PROCEDURE — 99285 EMERGENCY DEPT VISIT HI MDM: CPT

## 2025-08-03 RX ORDER — DIPHENHYDRAMINE HYDROCHLORIDE 50 MG/ML
25 INJECTION, SOLUTION INTRAMUSCULAR; INTRAVENOUS ONCE
Status: COMPLETED | OUTPATIENT
Start: 2025-08-03 | End: 2025-08-03

## 2025-08-03 RX ORDER — SODIUM CHLORIDE, SODIUM LACTATE, POTASSIUM CHLORIDE, AND CALCIUM CHLORIDE .6; .31; .03; .02 G/100ML; G/100ML; G/100ML; G/100ML
2000 INJECTION, SOLUTION INTRAVENOUS ONCE
Status: COMPLETED | OUTPATIENT
Start: 2025-08-03 | End: 2025-08-03

## 2025-08-03 RX ORDER — METOCLOPRAMIDE HYDROCHLORIDE 5 MG/ML
10 INJECTION INTRAMUSCULAR; INTRAVENOUS ONCE
Status: COMPLETED | OUTPATIENT
Start: 2025-08-03 | End: 2025-08-03

## 2025-08-03 RX ADMIN — SODIUM CHLORIDE, SODIUM LACTATE, POTASSIUM CHLORIDE, AND CALCIUM CHLORIDE 2000 ML: .6; .31; .03; .02 INJECTION, SOLUTION INTRAVENOUS at 01:54

## 2025-08-03 RX ADMIN — DIPHENHYDRAMINE HYDROCHLORIDE 25 MG: 50 INJECTION INTRAMUSCULAR; INTRAVENOUS at 01:56

## 2025-08-03 RX ADMIN — METOCLOPRAMIDE HYDROCHLORIDE 10 MG: 5 INJECTION INTRAMUSCULAR; INTRAVENOUS at 01:56

## 2025-08-03 ASSESSMENT — PAIN SCALES - GENERAL: PAINLEVEL_OUTOF10: 2

## 2025-08-03 ASSESSMENT — PAIN - FUNCTIONAL ASSESSMENT: PAIN_FUNCTIONAL_ASSESSMENT: 0-10

## 2025-08-04 LAB
EKG ATRIAL RATE: 67 BPM
EKG P AXIS: 64 DEGREES
EKG P-R INTERVAL: 148 MS
EKG Q-T INTERVAL: 424 MS
EKG QRS DURATION: 86 MS
EKG QTC CALCULATION (BAZETT): 448 MS
EKG R AXIS: -12 DEGREES
EKG T AXIS: 21 DEGREES
EKG VENTRICULAR RATE: 67 BPM